# Patient Record
Sex: MALE | Race: BLACK OR AFRICAN AMERICAN | Employment: FULL TIME | URBAN - METROPOLITAN AREA
[De-identification: names, ages, dates, MRNs, and addresses within clinical notes are randomized per-mention and may not be internally consistent; named-entity substitution may affect disease eponyms.]

---

## 2018-08-03 PROBLEM — R29.810 FACIAL DROOP: Status: ACTIVE | Noted: 2018-08-03

## 2018-08-03 PROBLEM — I63.9 CEREBROVASCULAR ACCIDENT (CVA) DETERMINED BY CLINICAL ASSESSMENT (HCC): Status: ACTIVE | Noted: 2018-08-03

## 2018-08-03 PROBLEM — I63.9 ACUTE CVA (CEREBROVASCULAR ACCIDENT) (HCC): Status: ACTIVE | Noted: 2018-08-03

## 2019-10-29 ENCOUNTER — OFFICE VISIT (OUTPATIENT)
Dept: ORTHOPEDIC SURGERY | Age: 49
End: 2019-10-29

## 2019-10-29 ENCOUNTER — HOSPITAL ENCOUNTER (OUTPATIENT)
Dept: OCCUPATIONAL MEDICINE | Age: 49
Discharge: HOME | End: 2019-10-29
Attending: FAMILY MEDICINE

## 2019-10-29 VITALS
TEMPERATURE: 99.1 F | HEART RATE: 80 BPM | HEIGHT: 76 IN | WEIGHT: 262.2 LBS | DIASTOLIC BLOOD PRESSURE: 88 MMHG | BODY MASS INDEX: 31.93 KG/M2 | SYSTOLIC BLOOD PRESSURE: 128 MMHG | RESPIRATION RATE: 15 BRPM

## 2019-10-29 DIAGNOSIS — M54.17 LUMBOSACRAL RADICULOPATHY AT L4: Primary | ICD-10-CM
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
## 2019-10-29 DIAGNOSIS — M54.17 LUMBOSACRAL RADICULOPATHY AT L4: ICD-10-CM

## 2019-10-29 RX ORDER — PREDNISONE 20 MG/1
TABLET ORAL
Qty: 28 TAB | Refills: 0 | Status: SHIPPED | OUTPATIENT
Start: 2019-10-29 | End: 2019-11-10 | Stop reason: SDUPTHER

## 2019-10-29 RX ORDER — NAPROXEN 250 MG/1
TABLET ORAL 2 TIMES DAILY WITH MEALS
COMMUNITY

## 2019-10-29 NOTE — LETTER
10/29/2019 2:33 PM 
 
Mr. Mark Carranza 
[address] [facility] [address] Will be off work due to medical condition until after follow-up following MRI, likely 2 weeks.  
 
 
Sincerely, 
 
 
Bryn Murillo DO

## 2019-10-29 NOTE — PROGRESS NOTES
HISTORY OF PRESENT ILLNESS    Mark is a 47y.o. year old male comes in today as new patient for: low back pain to right foot    Patients symptoms have been present for 3 weeks. Pain level 8/10 right low back and numb/tingle to right leg. It is unchanged with chiro and has done some PT first appt last week after referred at [facility].  Patient has tried:  Naproxen and robaxin without benefit. It is described as pain in low back into right foot most noticeable right great toe. Had helped someone lift something heavy at [employer] working a few days later had Sx into leg. + popping. IMAGING: XR lumbar pending. History reviewed. No pertinent surgical history. Social History     Socioeconomic History    Marital status: UNKNOWN     Spouse name: Not on file    Number of children: Not on file    Years of education: Not on file    Highest education level: Not on file   Tobacco Use    Smoking status: Never Smoker    Smokeless tobacco: Never Used   Substance and Sexual Activity    Alcohol use: Never     Alcohol/week: 0.0 standard drinks     Frequency: Never    Drug use: Never      Current Outpatient Medications   Medication Sig Dispense Refill    METHOCARBAMOL PO Take  by mouth.  naproxen (NAPROSYN) 250 mg tablet Take  by mouth two (2) times daily (with meals).  aspirin 81 mg chewable tablet Take 1 Tab by mouth daily. 30 Tab 0    amLODIPine-benazepril (LOTREL) 5-20 mg per capsule Take 1 Cap by mouth daily.  rosuvastatin (CRESTOR) 20 mg tablet Take 20 mg by mouth nightly. Past Medical History:   Diagnosis Date    Hypertension     Stroke [facility])      History reviewed. No pertinent family history. ROS:  No incont, fever. All other systems reviewed and negative aside from that written in the HPI. Objective:  /88   Pulse 80   Temp 99.1 °F (37.3 °C)   Resp 15   Ht 6' 4\" (1.93 m)   Wt 262 lb 3.2 oz (118.9 kg)   BMI 31.92 kg/m²   GEN:  Appears stated age in NAD.   NEURO: Sensation intact to light touch. Reflexes +1/4 patellar and Achilles bilaterally. M/S:  Examined seated and supine. Slump negative. LE Strength +5/5 bilaterally Piriformis some TTP right  EXT:  no clubbing/cyanosis. no edema. SKIN: Warm & dry w/o rash. HEENT: Conjunctiva/lids WNL. External canals/nares WNL. Tongue midline. PERRL, EOMI. Hearing intact. NECK: Trachea midline. Supple, Full ROM. No thyromegaly. CARDIAC: No edema. LUNGS: Normal effort. ABD: Soft, no masses. No HSM. PSYCH: A+O x3. Appropriate judgment and insight. Assessment/Plan:     ICD-10-CM ICD-9-CM    1. Lumbosacral radiculopathy at L4 M54.17 724.4 XR SPINE LUMB 2 OR 3 V      MRI LUMB SPINE WO CONT      predniSONE (DELTASONE) 20 mg tablet   2. Work related injury Y99.0 959.9 XR SPINE LUMB 2 OR 3 V      MRI LUMB SPINE WO CONT         Patient (or guardian if minor) verbalizes understanding of evaluation and plan. Will continue PT and Rx prednisone and await MRI and return for result. Letter off work until f/u.

## 2019-10-29 NOTE — PROGRESS NOTES
AVS reviewed: YES  HEP: N/A  Resources Provided: YES Printed Rx, work letter & MRI instructions  Patient questions/concerns answered: YES.  Confirmed MRI would be completed at [facility] or [address] [place] [address]  Patient verbalized understanding of treatment plan: Malik Otero

## 2019-10-29 NOTE — PATIENT INSTRUCTIONS
Continue with physical therapy, use medication as prescribed and return to clinic after MRI for results. You should receive call to schedule the MRI. If have not been called to schedule within a couple of days, please call [phone] to schedule. After MRI is scheduled, please call our office and schedule follow-up appointment for 2-3 days after the date of the MRI.

## 2019-10-29 NOTE — PROGRESS NOTES
Pt reports toe numbness in R foot. Ice and some massage pads helped short-term. Has been to chiro w/ some benefit.

## 2019-11-08 ENCOUNTER — HOSPITAL ENCOUNTER (OUTPATIENT)
Dept: MRI IMAGING | Age: 49
Discharge: HOME | End: 2019-11-08
Attending: FAMILY MEDICINE
Payer: SELF-PAY
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
## 2019-11-08 DIAGNOSIS — M54.17 LUMBOSACRAL RADICULOPATHY AT L4: ICD-10-CM

## 2019-11-08 PROCEDURE — 72148 MRI LUMBAR SPINE W/O DYE: CPT

## 2019-11-10 DIAGNOSIS — M54.17 LUMBOSACRAL RADICULOPATHY AT L4: ICD-10-CM

## 2019-11-12 RX ORDER — PREDNISONE 20 MG/1
TABLET ORAL
Qty: 28 TAB | Refills: 0 | Status: SHIPPED | OUTPATIENT
Start: 2019-11-12 | End: 2019-11-12 | Stop reason: SDUPTHER

## 2019-11-12 RX ORDER — PREDNISONE 20 MG/1
TABLET ORAL
Qty: 28 TAB | Refills: 0 | Status: SHIPPED | OUTPATIENT
Start: 2019-11-12 | End: 2019-11-15 | Stop reason: ALTCHOICE

## 2019-11-15 ENCOUNTER — OFFICE VISIT (OUTPATIENT)
Dept: ORTHOPEDIC SURGERY | Age: 49
End: 2019-11-15

## 2019-11-15 VITALS
WEIGHT: 258 LBS | RESPIRATION RATE: 15 BRPM | HEIGHT: 76 IN | TEMPERATURE: 98.9 F | HEART RATE: 105 BPM | DIASTOLIC BLOOD PRESSURE: 88 MMHG | BODY MASS INDEX: 31.42 KG/M2 | SYSTOLIC BLOOD PRESSURE: 136 MMHG

## 2019-11-15 DIAGNOSIS — M54.17 LUMBOSACRAL RADICULOPATHY AT S1: Primary | ICD-10-CM

## 2019-11-15 DIAGNOSIS — M51.36 BULGING LUMBAR DISC: ICD-10-CM

## 2019-11-15 NOTE — PROGRESS NOTES
HISTORY OF PRESENT ILLNESS    Mark is a 47y.o. year old male comes in today to be evaluated and treated for: back pain/MRI results    Since last appt has noticed improvement with prednisone taper. Pain level 5/10. Still significant pain low back into right foot and numbness great toe. PT helps while there but Sx recur after leaving. IMAGING: MRI lumbar 11/8/19  IMPRESSION:  No acute bony injury. Multilevel chronic degenerative disc disease L4/L5 and L5/S1. Large disc protrusion L5/S1 level impinges upon exiting right S1 nerve root and  narrows right neural foramen. Multiple levels facet arthropathy L2/L3 through L5/S1    History reviewed. No pertinent surgical history. Social History     Socioeconomic History    Marital status: UNKNOWN     Spouse name: Not on file    Number of children: Not on file    Years of education: Not on file    Highest education level: Not on file   Tobacco Use    Smoking status: Never Smoker    Smokeless tobacco: Never Used   Substance and Sexual Activity    Alcohol use: Never     Alcohol/week: 0.0 standard drinks     Frequency: Never    Drug use: Never     Current Outpatient Medications   Medication Sig Dispense Refill    METHOCARBAMOL PO Take  by mouth.  naproxen (NAPROSYN) 250 mg tablet Take  by mouth two (2) times daily (with meals).  aspirin 81 mg chewable tablet Take 1 Tab by mouth daily. 30 Tab 0    amLODIPine-benazepril (LOTREL) 5-20 mg per capsule Take 1 Cap by mouth daily.  rosuvastatin (CRESTOR) 20 mg tablet Take 20 mg by mouth nightly. Past Medical History:   Diagnosis Date    Hypertension     Stroke [facility])      History reviewed. No pertinent family history. ROS:  No incont, fever. Objective:  /88   Pulse (!) 105   Temp 98.9 °F (37.2 °C)   Resp 15   Ht 6' 4\" (1.93 m)   Wt 258 lb (117 kg)   BMI 31.40 kg/m²   GEN:  Appears stated age in NAD. NEURO:  Sensation decreased right lower leg.   Reflexes +1/4 patellar and Achilles bilaterally. M/S:  Examined seated and supine. Slump negative. LE Strength +5/5 bilaterally but decreased toe/foot dorsiflexion right. Piriformis some TTP right  EXT:  no clubbing/cyanosis. no edema. SKIN: Warm & dry w/o rash. Assessment/Plan:     ICD-10-CM ICD-9-CM    1. Lumbosacral radiculopathy at S1 M54.17 724.4 REFERRAL TO ORTHOPEDICS   2. Bulging lumbar disc M51.26 722.10 REFERRAL TO ORTHOPEDICS       Patient (or guardian if minor) verbalizes understanding of evaluation and plan.     Will continue prednisone and refer PMR for eval disc protrusion causing S1 radicular Sx

## 2019-11-15 NOTE — PROGRESS NOTES
AVS reviewed: YES  HEP: N/A  Resources Provided: YES work letter & referral to spine center  Patient questions/concerns answered: NO. Pt denied questions/concerns  Patient verbalized understanding of treatment plan: YES

## 2019-11-15 NOTE — LETTER
NOTIFICATION RETURN TO WORK / SCHOOL 
 
11/15/2019 11:40 AM 
 
Mr. Mark Baires [address] [facility] [id number] To Whom It May Concern: 
 
Mark Medrano is currently under the care of Alie Reid 2.. Off work pending evaluation spine specialist. 
 
If there are questions or concerns please have the patient contact our office.  
 
 
 
Sincerely, 
 
 
Venkata Zuniga, DO

## 2019-11-15 NOTE — PATIENT INSTRUCTIONS
Continue to use medication as prescribed, follow-up on referral to the [address] and return to our office if needed.

## 2019-11-18 ENCOUNTER — OFFICE VISIT (OUTPATIENT)
Dept: ORTHOPEDIC SURGERY | Age: 49
End: 2019-11-18

## 2019-11-18 VITALS
HEART RATE: 108 BPM | HEIGHT: 76 IN | DIASTOLIC BLOOD PRESSURE: 99 MMHG | OXYGEN SATURATION: 98 % | WEIGHT: 265 LBS | TEMPERATURE: 98.3 F | BODY MASS INDEX: 32.27 KG/M2 | SYSTOLIC BLOOD PRESSURE: 145 MMHG

## 2019-11-18 DIAGNOSIS — M51.26 HNP (HERNIATED NUCLEUS PULPOSUS), LUMBAR: ICD-10-CM

## 2019-11-18 DIAGNOSIS — M54.16 RIGHT LUMBAR RADICULOPATHY: Primary | ICD-10-CM

## 2019-11-18 RX ORDER — GABAPENTIN 300 MG/1
300 CAPSULE ORAL 3 TIMES DAILY
Qty: 90 CAP | Refills: 1 | Status: SHIPPED | OUTPATIENT
Start: 2019-11-18 | End: 2020-02-23 | Stop reason: SDUPTHER

## 2019-11-18 RX ORDER — ACETAMINOPHEN AND CODEINE PHOSPHATE 300; 30 MG/1; MG/1
1 TABLET ORAL
Qty: 21 TAB | Refills: 0 | Status: SHIPPED | OUTPATIENT
Start: 2019-11-18 | End: 2019-11-25

## 2019-11-18 RX ORDER — LIDOCAINE HYDROCHLORIDE 20 MG/ML
0.5 INJECTION, SOLUTION EPIDURAL; INFILTRATION; INTRACAUDAL; PERINEURAL ONCE
Qty: 0.5 ML | Refills: 0
Start: 2019-11-18 | End: 2019-11-18

## 2019-11-18 RX ORDER — BETAMETHASONE SODIUM PHOSPHATE AND BETAMETHASONE ACETATE 3; 3 MG/ML; MG/ML
12 INJECTION, SUSPENSION INTRA-ARTICULAR; INTRALESIONAL; INTRAMUSCULAR; SOFT TISSUE ONCE
Qty: 2 ML | Refills: 0
Start: 2019-11-18 | End: 2019-11-18

## 2019-11-18 RX ORDER — BUPIVACAINE HYDROCHLORIDE 2.5 MG/ML
0.5 INJECTION, SOLUTION INFILTRATION; PERINEURAL ONCE
Qty: 0.5 ML | Refills: 0
Start: 2019-11-18 | End: 2019-11-18

## 2019-11-18 NOTE — PROGRESS NOTES
Verbal order entered per Dr. Marisa Tyler as documented on blue sheet:Right iliolumbar-Trigger point injection, 2 ml celestone, 0.5 ml lidocaine, 0.5 ml marcaine

## 2019-11-18 NOTE — PROGRESS NOTES
[address], [address]  [address]  Phone: [phone]  Fax: [phone]        Beltran Manuel  : 1970  PCP: Hernan Lopez MD      NEW PATIENT      ASSESSMENT AND PLAN     Diagnoses and all orders for this visit:    1. Right lumbar radiculopathy w/foot drop  -     bupivacaine (MARCAINE) 0.25 % (2.5 mg/mL) soln injection; 0.5 mL by SubCUTAneous route once for 1 dose. -     INJECTION, BUPIVICAINE HYDRO  -     LIDOCAINE INJECTION  -     lidocaine, PF, (XYLOCAINE) 20 mg/mL (2 %) injection; 0.5 mL by Other route once for 1 dose. -     betamethasone (CELESTONE SOLUSPAN) 6 mg/mL injection; 2 mL by IntraMUSCular route once for 1 dose.  -     BETAMETHASONE ACETATE & SODIUM PHOSPHATE INJECTION 3 MG EA.  -     AZ INJECT TRIGGER POINT, 1 OR 2  -     gabapentin (NEURONTIN) 300 mg capsule; Take 1 Cap by mouth three (3) times daily. Max Daily Amount: 900 mg.  -     acetaminophen-codeine (TYLENOL-CODEINE #3) 300-30 mg per tablet; Take 1 Tab by mouth three (3) times daily as needed for Pain (severe) for up to 7 days. Max Daily Amount: 3 Tabs. 1 tab po every day to TID prn    2. HNP (herniated nucleus pulposus), lumbar, RL5/S1       1. Advised to stay active as tolerated. 2. Trial of Gabapentin  3. Discussed life style modification, PT, medication, TPI, spinal injection, and surgery as treatment options   4. Acute pain rx of T#3  5. R iliolumbar TPI  6. Given information on herniated disc, deciding on surgery, TPI    F/U with Dr. Pamela Funes for surgical eval      CHIEF Nathaniel Bowles is seen today in consultation at the request of Dr. Truong Izaguirre for complaints of low back pain radiating into RLE. HISTORY OF PRESENT ILLNESS  Mark Toribio is a 47 y.o. male. Today pt c/o low back and RLE pain since 18. Pt has had trauma that caused pain. He reports an injury twisting and lifting cables at work. Works as  at [employer].  Has been OOW.    Pt reports a hx of pinched nerve 2009. He reports little benefit with PT this time. He states since then his pain has not improved. He reports he has been unable to return to work since 9/20/19. Denies trying Gabapentin previously. PT/NSAID/muscle relaxer/chiro not really helping. MRI images reviewed with patient, prominent protrusion canal, lateral recess, and far lateral. No B/B issues. Location of pain: low back  Does pain radiate into extremities: RLE to toe, numbness in toe. L5 distribution  Does patient have weakness: R foot flops  Pt denies saddle paresthesias. Medications pt is on: Naprosyn 250mg PRN some benefit. Robaxin little benefit. MDP little benefit. Pt denies recent ED visits or hospitalizations. Denies persistent fevers, chills, weight changes, neurogenic bowel or bladder symptoms. Pt denies any recent fevers, chills, antibiotics, recent cortisone injections, or infections. Treatments patient has tried:  Physical therapy:Yes current low back temporary benefit  Doing HEP: Yes  Non-opioid medications: Yes  Spinal injections: No  Spinal surgery- No.   Last L MRI 2019: R L5-S1 disc protrusion     reviewed. PMHx of HTN, CVA, no residuals. Pt works as an  for [employer]. OOW since injury, on STD. Pain Assessment  11/18/2019   Location of Pain Back;Leg   Location Modifiers -   Severity of Pain 6   Quality of Pain Other (Comment)   Quality of Pain Comment stabbing   Duration of Pain Persistent   Frequency of Pain Constant   Aggravating Factors Straightening; Other (Comment)   Aggravating Factors Comment laying on effected side   Limiting Behavior -   Relieving Factors Ice;NSAID   Result of Injury -         MRI Results (most recent):  Results from Hospital Encounter encounter on 11/08/19   MRI LUMB SPINE WO CONT    Narrative MR LUMBAR SPINE WITHOUT CONTRAST    CPT CODE: 22862    HISTORY: Right-sided back pain for 2 months with no known injury.  Pain  persisting despite conservative treatment. Radicular symptoms after lifting a  heavy object 3 weeks ago. Difficulty ambulating. COMPARISON: Plain film series 10/29/2019    TECHNIQUE:  Axial and sagittal sequences of lumbar spine using T1, IR, T2  information. Motion artifact such that multiple series had to be repeated    FINDINGS:     Alignment: Normal alignment is seen of the lumbar spine. Overall straightening  normal lordosis. Vertebral body height: There is no acute loss of vertebral body height. Bone marrow signal: Fatty infiltration around the endplates C4/Z8. Conus: is unremarkable and ends at the L1 level. Disc spaces: Decreased T2 signal L4/L5 and L5/S1 disc spaces. There is moderate  disc space narrowing L4/L5. Soft tissues: High T2 signal lesion right kidney measures 11 mm and is  compatible with cyst.      At the L1/L2 level,  no central or foraminal stenosis. At the L2/L3 level,  no central or foraminal stenosis. Mild ligamentous and  facet hypertrophy. At the L3/L4 level,  no central or foraminal stenosis. Mild ligamentous and  facet hypertrophy. At the L4/L5 level,  disc desiccation. Mild to moderate facet and ligamentous  hypertrophy. Mild narrowing of the foramina due to facet encroachment. At the L5/S1 level,  there is right posterolateral and far lateral disc  protrusion with underlying mild diffuse bulge of the disc. No significant mass  effect on thecal sac. Protruding disc material on the right markedly narrows right lateral recess  displacing exiting S1 nerve root posteriorly, narrows right neural foramen. Left  foramen mildly narrowed due to spondylitic changes and facet encroachment. .  Moderate facet and ligamentous hypertrophy. Impression IMPRESSION:    No acute bony injury. Multilevel chronic degenerative disc disease L4/L5 and L5/S1. Large disc protrusion L5/S1 level impinges upon exiting right S1 nerve root and  narrows right neural foramen.     Multiple levels facet arthropathy L2/L3 through L5/S1          PAST MEDICAL HISTORY   Past Medical History:   Diagnosis Date    Hypertension     Stroke [facility])        History reviewed. No pertinent surgical history. MEDICATIONS      Current Outpatient Medications   Medication Sig Dispense Refill    bupivacaine (MARCAINE) 0.25 % (2.5 mg/mL) soln injection 0.5 mL by SubCUTAneous route once for 1 dose. 0.5 mL 0    lidocaine, PF, (XYLOCAINE) 20 mg/mL (2 %) injection 0.5 mL by Other route once for 1 dose. 0.5 mL 0    betamethasone (CELESTONE SOLUSPAN) 6 mg/mL injection 2 mL by IntraMUSCular route once for 1 dose. 2 mL 0    gabapentin (NEURONTIN) 300 mg capsule Take 1 Cap by mouth three (3) times daily. Max Daily Amount: 900 mg. 90 Cap 1    acetaminophen-codeine (TYLENOL-CODEINE #3) 300-30 mg per tablet Take 1 Tab by mouth three (3) times daily as needed for Pain (severe) for up to 7 days. Max Daily Amount: 3 Tabs. 1 tab po every day to TID prn 21 Tab 0    METHOCARBAMOL PO Take  by mouth.  naproxen (NAPROSYN) 250 mg tablet Take  by mouth two (2) times daily (with meals).  amLODIPine-benazepril (LOTREL) 5-20 mg per capsule Take 1 Cap by mouth daily.  rosuvastatin (CRESTOR) 20 mg tablet Take 20 mg by mouth nightly.  aspirin 81 mg chewable tablet Take 1 Tab by mouth daily.  30 Tab 0       ALLERGIES  No Known Allergies       SOCIAL HISTORY    Social History     Socioeconomic History    Marital status: UNKNOWN     Spouse name: Not on file    Number of children: Not on file    Years of education: Not on file    Highest education level: Not on file   Occupational History    Not on file   Social Needs    Financial resource strain: Not on file    Food insecurity:     Worry: Not on file     Inability: Not on file    Transportation needs:     Medical: Not on file     Non-medical: Not on file   Tobacco Use    Smoking status: Never Smoker    Smokeless tobacco: Never Used   Substance and Sexual Activity    Alcohol use: Never     Alcohol/week: 0.0 standard drinks     Frequency: Never    Drug use: Never    Sexual activity: Not on file   Lifestyle    Physical activity:     Days per week: Not on file     Minutes per session: Not on file    Stress: Not on file   Relationships    Social connections:     Talks on phone: Not on file     Gets together: Not on file     Attends Evangelical service: Not on file     Active member of club or organization: Not on file     Attends meetings of clubs or organizations: Not on file     Relationship status: Not on file    Intimate partner violence:     Fear of current or ex partner: Not on file     Emotionally abused: Not on file     Physically abused: Not on file     Forced sexual activity: Not on file   Other Topics Concern    Not on file   Social History Narrative    Not on file       FAMILY HISTORY  No family history on file. REVIEW OF SYSTEMS  Review of Systems   Constitutional: Negative for chills, fever and weight loss. Respiratory: Negative for shortness of breath. Cardiovascular: Negative for chest pain. Gastrointestinal: Negative for constipation. Negative for fecal incontinence   Genitourinary: Negative for dysuria. Negative for urinary incontinence   Musculoskeletal:        Per HPI   Skin: Negative for rash. Neurological: Positive for tingling and focal weakness. Negative for dizziness, tremors and headaches. Endo/Heme/Allergies: Does not bruise/bleed easily. Psychiatric/Behavioral: The patient does not have insomnia. PHYSICAL EXAMINATION  Visit Vitals  BP (!) 145/99 (BP 1 Location: Left arm, BP Patient Position: Sitting)   Pulse (!) 108   Temp 98.3 °F (36.8 °C) (Oral)   Ht 6' 4\" (1.93 m)   Wt 265 lb (120.2 kg)   SpO2 98%   BMI 32.26 kg/m²          Accompanied by self. Constitutional:  Well developed, well nourished, in no acute distress. Psychiatric: Affect and mood are appropriate.    Integumentary: No rashes or abrasions noted on exposed areas. Cardiovascular/Peripheral Vascular: No peripheral edema is noted BLE. SPINE/MUSCULOSKELETAL EXAM       Lumbar spine:  No rash, ecchymosis, or gross obliquity. No fasciculations. No focal atrophy is noted. Tenderness to palpation R L4-5, R Sciatic notch. SI joints non-tender. Trochanters non tender. MOTOR:       Hip Flex  Quads Hamstrings Ankle DF EHL Ankle PF   Right +4/5 +4/5 +4/5 4/5 +4/5 +4/5   Left +4/5 +4/5 +4/5 +4/5 +4/5 +4/5     Straight Leg raise positive R 45 degrees. Offloads R buttock. No difficulty with tandem gait. Radiating RLE pain with Heel walk. Radiating RLE pain with Toe rise. Ambulation with quad cane with limp. DWB RLE. [facility]  OFFICE PROCEDURE PROGRESS NOTE      PROCEDURE: In the office today after informed consent using aseptic technique, the patient was injected with a total of 2 cc of Celestone, 0.5 cc each of Lidocaine and Marcaine into his right iliolumbar trigger point. Chart reviewed for the following:   Dr. Bonnie VIRGEN, have reviewed the History, Physical and updated the Allergic reactions for Ilex Tiff Courts performed immediately prior to start of procedure:   Dr. Bonnie VIRGEN, have performed the following reviews on Ilex Grace Swan prior to the start of the procedure:            * Patient was identified by name and date of birth   * Agreement on procedure being performed was verified  * Risks and Benefits explained to the patient  * Procedure site verified and marked as necessary  * Patient was positioned for comfort  * Consent was signed and verified     Time: 2:07 PM     Date of procedure: 11/18/2019    Procedure performed by:  Harrison Palm MD    Provider assisted by: None    Patient assisted by: Self    How tolerated by patient: Pt tolerated the procedure well with no complications.               Written by Kishor Grimm, as dictated by Bonnie Perry MD.    Dr. PROMISE Elif Don MD, confirm that all documentation is accurate. Mr. Anne Marie Don may have a reminder for a \"due or due soon\" health maintenance. I have asked that he contact his primary care provider for follow-up on this health maintenance.

## 2019-12-06 ENCOUNTER — OFFICE VISIT (OUTPATIENT)
Dept: ORTHOPEDIC SURGERY | Age: 49
End: 2019-12-06

## 2019-12-06 VITALS
DIASTOLIC BLOOD PRESSURE: 96 MMHG | SYSTOLIC BLOOD PRESSURE: 158 MMHG | BODY MASS INDEX: 31.9 KG/M2 | HEART RATE: 93 BPM | RESPIRATION RATE: 18 BRPM | HEIGHT: 76 IN | WEIGHT: 262 LBS

## 2019-12-06 DIAGNOSIS — M51.26 HNP (HERNIATED NUCLEUS PULPOSUS), LUMBAR: Primary | ICD-10-CM

## 2019-12-06 NOTE — PROGRESS NOTES
[address]  [address], [address]  [place], [address] [place] [address] Street  Phone: [phone]  Fax: [phone]  INITIAL CONSULTATION  Patient: Bia Zuniga                MRN: [id number]       SSN: [id number]  YOB: 1970        AGE: 47 y.o. SEX: male  Body mass index is 31.89 kg/m². PCP: Germain Harris MD  12/06/19    Chief Complaint   Patient presents with    Back Pain     SC         HISTORY OF PRESENT ILLNESS, RADIOGRAPHS, and PLAN:         HISTORY OF PRESENT ILLNESS:  Mr. Jessica Kan is seen today at the request of Dr. Cirilo Castro and Dr. Cristela Jackson. Mr. Jessica Kan is a pleasant, 50-year-old male with a history of hypertension. He works doing manual labor. He was working installing a pump in September. The pump was tipping over, and he grabbed it, and the lifting and twisting motion gave onset of severe, right-sided back and lower extremity pain. He has been through medications, physical therapy, and injections. He developed weakness in his right foot with a relative right-sided foot drop with 3-4/5 weakness of his EHL and tib/ant. He has an equivocal straight leg raise on that right side. He is referred by Dr. Cristela Jackson for consideration of surgery. RADIOGRAPHS:  MRI of his lumbar spine demonstrates a very large disc herniation extending from the lateral region at L5-S1 on the right extending up all the way across the facet to the far lateral region of L5-S1, as if the entire quadrant of the disc gave way at L5-S1 on the patients right-hand side obliterating the foramina, lateral recess, and also causing probable L5 root compression extra foraminally in a far lateral region. The patient has been on Gabapentin. His pain recently over the past week or two is moderated from a 10/10 to a 5/10. He feels more comfortable and is more able to ambulate. ASSESSMENT/PLAN: I discussed the matter at length with him.   We have a patient with relative weakness in his right leg and a very large disc herniation who is about, now, three months out from his injury and is noting some improvement in his pain, though on neuropathics. His disc injury, however, is severe, and it is the type of disc injury that adequately decompressed would likely require a decompression and fusion of L5-S1 given that I would need to do a facetectomy at that segment to decompress the foramina and get this very large disc herniation removed, that facetectomy would require stabilization in a L5-S1 fusion. I discussed the matter at length with him. I think given his recent improvement and the significant scale of the surgery that would be entailed, continued observation and exercise is reasonable. If he continues to improve while on Gabapentin, and his pain moderates, we may be able to get away with this problem without consideration of surgery. However, should his symptoms plateau or worsen, and L5-S1 laminectomy and fusion, I believe, would be in order. I will see him back again in four weeks to monitor his progress. I provided him an out of work note for his pending Workers Compensation complaint. cc: KAY Wright M.D. Past Medical History:   Diagnosis Date    Hypertension     Stroke [facility])        History reviewed. No pertinent family history. Current Outpatient Medications   Medication Sig Dispense Refill    gabapentin (NEURONTIN) 300 mg capsule Take 1 Cap by mouth three (3) times daily. Max Daily Amount: 900 mg. 90 Cap 1    naproxen (NAPROSYN) 250 mg tablet Take  by mouth two (2) times daily (with meals).  aspirin 81 mg chewable tablet Take 1 Tab by mouth daily. 30 Tab 0    amLODIPine-benazepril (LOTREL) 5-20 mg per capsule Take 1 Cap by mouth daily.  rosuvastatin (CRESTOR) 20 mg tablet Take 20 mg by mouth nightly.  METHOCARBAMOL PO Take  by mouth. No Known Allergies    History reviewed.  No pertinent surgical history. Past Medical History:   Diagnosis Date    Hypertension     Stroke [facility])        Social History     Socioeconomic History    Marital status: UNKNOWN     Spouse name: Not on file    Number of children: Not on file    Years of education: Not on file    Highest education level: Not on file   Occupational History    Not on file   Social Needs    Financial resource strain: Not on file    Food insecurity:     Worry: Not on file     Inability: Not on file    Transportation needs:     Medical: Not on file     Non-medical: Not on file   Tobacco Use    Smoking status: Never Smoker    Smokeless tobacco: Never Used   Substance and Sexual Activity    Alcohol use: Never     Alcohol/week: 0.0 standard drinks     Frequency: Never    Drug use: Never    Sexual activity: Not on file   Lifestyle    Physical activity:     Days per week: Not on file     Minutes per session: Not on file    Stress: Not on file   Relationships    Social connections:     Talks on phone: Not on file     Gets together: Not on file     Attends Christian service: Not on file     Active member of club or organization: Not on file     Attends meetings of clubs or organizations: Not on file     Relationship status: Not on file    Intimate partner violence:     Fear of current or ex partner: Not on file     Emotionally abused: Not on file     Physically abused: Not on file     Forced sexual activity: Not on file   Other Topics Concern    Not on file   Social History Narrative    Not on file           REVIEW OF SYSTEMS:   CONSTITUTIONAL SYMPTOMS:  Negative. EYES:  Negative. EARS, NOSE, THROAT AND MOUTH:  Negative. CARDIOVASCULAR:  Negative. RESPIRATORY:  Negative. GENITOURINARY: Per HPI. GASTROINTESTINAL:  Per HPI. INTEGUMENTARY (SKIN AND/OR BREAST):  Negative. MUSCULOSKELETAL: Per HPI.   ENDOCRINE/RHEUMATOLOGIC:  Negative. NEUROLOGICAL:  Per HPI. HEMATOLOGIC/LYMPHATIC:  Negative.    ALLERGIC/IMMUNOLOGIC: Negative. PSYCHIATRIC:  Negative. PHYSICAL EXAMINATION:   Visit Vitals  BP (!) 158/96 (BP 1 Location: Left arm, BP Patient Position: Sitting)   Pulse 93   Resp 18   Ht 6' 4\" (1.93 m)   Wt 262 lb (118.8 kg)   BMI 31.89 kg/m²    PAIN SCALE: 5/10    CONSTITUTIONAL: The patient is in no apparent distress and is alert and oriented x 3. HEENT: Normocephalic. Hearing grossly intact. NECK: Supple and symmetric. no tenderness, or masses were felt. RESPIRATORY: No labored breathing. CARDIOVASCULAR: The carotid pulses were normal. Peripheral pulses were 2+. CHEST: Normal AP diameter and normal contour without any kyphoscoliosis. LYMPHATIC: No lymphadenopathy was appreciated in the neck, axillae or groin. SKIN:  Negative for scars, rashes, lesions, or ulcers on the right upper, right lower, left upper, left lower and trunk. NEUROLOGICAL: Alert and oriented x 3. Ambulation without assistive device. FWB. EXTREMITIES:  See musculoskeletal.  MUSCULOSKELETAL:   Head and Neck:  Negative for misalignment, asymmetry, crepitation, defects, tenderness masses or effusions.  Left Upper Extremity: Inspection, percussion and palpation performed. Hoangs sign is negative.  Right Upper Extremity: Inspection, percussion and palpation performed. Hoangs sign is negative.  Spine, Ribs and Pelvis: Back pain radiating into RLE. Inspection, percussion and palpation performed. Negative for misalignment, asymmetry, crepitation, defects, tenderness masses or effusions.  Left Lower Extremity: Inspection, percussion and palpation performed. Negative straight leg raise.  Right Lower Extremity: Radiating pain L5. Numbness. Weakness in EHL. Mild weakness of Tib/ant. Inspection, percussion and palpation performed. Equivocal straight leg raise. SPINE EXAM:     Lumbar spine: No rash, ecchymosis, or gross obliquity. No focal atrophy is noted. ASSESSMENT    ICD-10-CM ICD-9-CM    1.  HNP (herniated nucleus pulposus), lumbar, RL5/S1 M51.26 722.10        Written by Sam Romero, as dictated by Mil Mace MD.    I, Dr. Mil Mace MD, confirm that all documentation is accurate.

## 2019-12-06 NOTE — PATIENT INSTRUCTIONS
Deciding About Surgery for a Herniated Disc How can you decide about surgery for a herniated disc? What kinds of surgery are done for a herniated disc? The most common surgeries are: · Discectomy. It takes out part of the disc that is pressing on a nerve root or on the spinal cord. It works best for people who still have bad pain (sciatica) after they have tried other treatments. · Percutaneous discectomy. It also takes out any part of the disc that is pressing on a nerve. But it is done with a special tool through a very small cut. · Laminotomy and laminectomy. They ease pressure caused by changes in the spine from aging. ? Laminotomy takes out some of the lamina. This is the thin part of the vertebrae that protects the spinal cord. ? Laminectomy takes out most of or all of the lamina. It also may take out tissue that is making the spinal canal too narrow. What are key points about this decision? · Most people get better after a few months of treatment without surgery. This treatment includes rest, medicines, shots, and rehabilitation. · Surgery may be a good choice if you have severe pain, numbness, or weakness in your buttock and leg. This is called sciatica. It can keep you from being able to do your daily activities. · If you have sciatica from a herniated disc, you will probably feel better sooner if you have surgery. But after 5 to 10 years, how well you can do your daily activities will probably be about the same whether you have surgery or not. · A doctor may advise you to have surgery. Ask your doctor about the possible risks and benefits of surgery and other treatments. The more you know, the better your decision will be. · Surgery for a herniated disc does not often cause problems. But there is a slight risk of harming nerves or the spine during surgery. Or you could have problems from the anesthesia. There is also a chance that you could get an infection. Why might you choose surgery? · Surgery works well for many people who have sciatica from a herniated disc. · Surgery offers faster pain relief than other treatment. · With surgery, most people can go back to work sooner. · You have tried exercises and medicines for a few months. You do not think they have helped you. · You feel okay about the idea of having back surgery. Why might you choose not to have surgery? · Treatments without surgery work for most people. · Research shows that 10 years after treatment, people who did not have surgery are just about as likely to be able to do their daily activities as people who had surgery. · You do not like the thought of surgery. · Your pain is not bad enough that you need to have surgery right now. Your decision Thinking about the facts and your feelings can help you make a decision that is right for you. Be sure you understand the benefits and risks of your options, and think about what else you need to do before you make the decision. Where can you learn more? Go to http://elizabeth-adelaida.info/. Enter U714 in the search box to learn more about \"Deciding About Surgery for a Herniated Disc. \" Current as of: June 26, 2019 Content Version: 12.2 © 3762-2606 [employer]. Care instructions adapted under license by [employer] (which disclaims liability or warranty for this information). If you have questions about a medical condition or this instruction, always ask your healthcare professional. [place] [address] any warranty or liability for your use of this information. Lumbar Spinal Fusion: Before Your Surgery What is lumbar spinal fusion? Spinal fusion is surgery that joins, or fuses, two or more vertebrae together. The joints will no longer be able to move.  
Most of the time, bone from your pelvic bone or from a bone bank is used to make a \"bridge\" between the vertebrae to be joined. Metal rods, wires, or screws are often attached to the vertebrae. This will hold them together until new bone grows between them. Spinal fusion is major surgery. It usually lasts several hours. It involves making a cut in your back or your belly or sometimes both. The cuts, called incisions, leave scars that fade with time. You can expect your back to feel stiff and sore after surgery. You will be given pain medicine. You will probably get up and walk at the hospital. Gaurang Ramos will be in the hospital for several days. It may take 4 to 6 weeks to get back to doing simple activities, such as light housework. Follow-up care is a key part of your treatment and safety. Be sure to make and go to all appointments, and call your doctor if you are having problems. It's also a good idea to know your test results and keep a list of the medicines you take. What happens before surgery? 
 Surgery can be stressful. This information will help you understand what you can expect. And it will help you safely prepare for surgery. 
 Preparing for surgery 
  · Understand exactly what surgery is planned, along with the risks, benefits, and other options. · Tell your doctors ALL the medicines, vitamins, supplements, and herbal remedies you take. Some of these can increase the risk of bleeding or interact with anesthesia.  
  · If you take blood thinners, such as warfarin (Coumadin), clopidogrel (Plavix), or aspirin, be sure to talk to your doctor. He or she will tell you if you should stop taking these medicines before your surgery. Make sure that you understand exactly what your doctor wants you to do.  
  · Your doctor will tell you which medicines to take or stop before your surgery. You may need to stop taking certain medicines a week or more before surgery. So talk to your doctor as soon as you can.  
  · If you have an advance directive, let your doctor know.  It may include a living will and a durable power of  for health care. Bring a copy to the hospital. If you don't have one, you may want to prepare one. It lets your doctor and loved ones know your health care wishes. Doctors advise that everyone prepare these papers before any type of surgery or procedure. What happens on the day of surgery? · Follow the instructions exactly about when to stop eating and drinking. If you don't, your surgery may be canceled. If your doctor told you to take your medicines on the day of surgery, take them with only a sip of water.  
  · Take a bath or shower before you come in for your surgery. Do not apply lotions, perfumes, deodorants, or nail polish.  
  · Do not shave the surgical site yourself.  
  · Take off all jewelry and piercings. And take out contact lenses, if you wear them.  
 At the hospital or surgery center · Bring a picture ID.  
  · The area for surgery is often marked to make sure there are no errors.  
  · You will be kept comfortable and safe by your anesthesia provider. You will be asleep during the surgery.  
  · Surgery will take several hours. Going home · Be sure you have someone to drive you home. Anesthesia and pain medicine make it unsafe for you to drive.  
  · You will be given more specific instructions about recovering from your surgery. They will cover things like diet, wound care, follow-up care, driving, and getting back to your normal routine. When should you call your doctor? · You have questions or concerns.  
  · You do not understand how to prepare for your surgery.  
  · You become ill before surgery (such as fever, cold or flu, chest pain, or shortness of breath).  
  · You need to reschedule or have changed your mind about having the surgery. Where can you learn more? Go to http://elizabeth-adelaida.info/. Enter B752 in the search box to learn more about \"Lumbar Spinal Fusion: Before Your Surgery. \" 
 Current as of: June 26, 2019 Content Version: 12.2 © 9689-1726 [employer]. Care instructions adapted under license by [employer] (which disclaims liability or warranty for this information). If you have questions about a medical condition or this instruction, always ask your healthcare professional. [address] any warranty or liability for your use of this information.

## 2019-12-06 NOTE — LETTER
12/6/2019 1:25 PM 
 
Mr. Ilex Sherlynn Boast [address] [facility] [id number] To Whom It May Concern: 
 
Ilex Sherlynn Boast is currently under the care of [address] [place] [address]. He was evaluated in the office today. He will remain out of work until next office visit in 4 weeks. If there are questions or concerns please have the patient contact our office.  
 
 
 
Sincerely, 
 
 
 
Chelita Quinones MD

## 2019-12-17 ENCOUNTER — DOCUMENTATION ONLY (OUTPATIENT)
Dept: ORTHOPEDIC SURGERY | Age: 49
End: 2019-12-17

## 2019-12-17 NOTE — PROGRESS NOTES
Patients wife dropped off WC & FMLA forms to be completed. She was informed that it will be 7-10 business days. He will  when ready. FORMS SCANNED INTO CHART.  RETURN TO ME FOR PROCESSING!!!!!

## 2019-12-19 ENCOUNTER — TELEPHONE (OUTPATIENT)
Dept: ORTHOPEDIC SURGERY | Age: 49
End: 2019-12-19

## 2019-12-19 NOTE — TELEPHONE ENCOUNTER
FMLA completed and needs Dr Ulysses Quach signature. A narrative for WC needs to be done by Dr. Ulysses Quach and given to Que brownlee to put together the chart for him.

## 2019-12-20 ENCOUNTER — DOCUMENTATION ONLY (OUTPATIENT)
Dept: ORTHOPEDIC SURGERY | Age: 49
End: 2019-12-20

## 2019-12-20 NOTE — PROGRESS NOTES
I called and spoke to patient. I advised patient that FMLA form is ready for . Patient stated understanding. I have scanned completed form into cc. Form has been placed in form box for . No further action needed.

## 2019-12-23 NOTE — PROGRESS NOTES
The other half of the patients request was for a letter to worker's comp. I tried both of the patients numbers in the system and both said they were not available at this time. The work comp letter from Dr. Marshal Klein is complete, however, it is not signed. Dr. Marshal Klein asked me to send it with a note that he will sign when he returns from his leave and send it again.   I am mailing a copy to the patient but not [employer] until I have a signed copy, Thanks, Nuria Otero

## 2019-12-31 ENCOUNTER — DOCUMENTATION ONLY (OUTPATIENT)
Dept: ORTHOPEDIC SURGERY | Age: 49
End: 2019-12-31

## 2019-12-31 NOTE — PROGRESS NOTES
Patient came to [facility] today and brought all his paperwork requesting a Narrative Medical Report from Dr Juani Bocanegra. Dr Juani Bocanegra has dictated a letter and it is on his desk for his signature. He is going to give to Mary Cook when signed and she is going to send to [facility] and send patient a copy and scan in CC.

## 2020-01-07 ENCOUNTER — DOCUMENTATION ONLY (OUTPATIENT)
Dept: ORTHOPEDIC SURGERY | Age: 50
End: 2020-01-07

## 2020-01-07 NOTE — PROGRESS NOTES
Spoke to patient he is coming to  his copy of letter.  It is in the medical records file at the  at [address].

## 2020-01-07 NOTE — PROGRESS NOTES
DR. Armand Longoria COMPLETED LETTER FOR PATIENT. MAILED COPIES TO [employer] AND THE [facility] STORE. I ALSO FAXED A COPY TO CARA ATTBLAISE DICKEY/KINDRA RAMIREZ.   COPY SENT TO SCAN AND [facility] IS CALLING PATIENT TO COME  A COPY, [address], [address]

## 2020-02-23 DIAGNOSIS — M54.16 RIGHT LUMBAR RADICULOPATHY: ICD-10-CM

## 2020-02-24 NOTE — TELEPHONE ENCOUNTER
I called and spoke with Mr. Tim Dash. The pt was identified using 2 pt identifiers. He was asked about how he is taking his medication. He verbalized that he is supposed to be taking his medication 3 times a day but has only been able to take it once a day. According to the pt, the [address] stopped taking his insurance. He tried to have his prescription transferred to the [facility] on [address] [facility], but Mind[facility] to a [facility] on [address]. The pt states that this is too far for him. He is requesting a new prescription from the office to be sent to the [facility] location. Please advise.

## 2020-02-24 NOTE — TELEPHONE ENCOUNTER
Attempted to contact the pt on all 3 numbers listed and was not able to reach him. No messages were left on any number due to multiple different reasons. See outgoing calls.

## 2020-02-24 NOTE — TELEPHONE ENCOUNTER
Call pt and find out how he has been taking the gabapentin 300 mg.   It is ordered tid, but he last filled it in Nov for #90 and would have run out in Dec.

## 2020-02-25 RX ORDER — GABAPENTIN 300 MG/1
300 CAPSULE ORAL 3 TIMES DAILY
Qty: 90 CAP | Refills: 1 | Status: SHIPPED | OUTPATIENT
Start: 2020-02-25 | End: 2020-06-02 | Stop reason: SDUPTHER

## 2020-04-06 ENCOUNTER — TELEPHONE (OUTPATIENT)
Dept: ORTHOPEDIC SURGERY | Age: 50
End: 2020-04-06

## 2020-04-06 NOTE — TELEPHONE ENCOUNTER
Pt called and would like a handicap sticker for work. He works at [employer] and is walking 20-30 mins a day.  Please call

## 2020-04-07 NOTE — TELEPHONE ENCOUNTER
Called patient [phone] and verified his name and date of birth. I Informed patient that the provider has approved his request the DMV placard and he could come and pick it up from our [facility]. Patient states he gets off at 4 PM and he will stop by tomorrow and pick it up or call our office and let us know what when he will get here. Patient verbalized understanding. No further action needed at this time.

## 2020-05-27 ENCOUNTER — OFFICE VISIT (OUTPATIENT)
Dept: ORTHOPEDIC SURGERY | Facility: CLINIC | Age: 50
End: 2020-05-27

## 2020-05-27 VITALS
BODY MASS INDEX: 32.88 KG/M2 | WEIGHT: 270 LBS | SYSTOLIC BLOOD PRESSURE: 109 MMHG | TEMPERATURE: 98.3 F | HEART RATE: 71 BPM | HEIGHT: 76 IN | DIASTOLIC BLOOD PRESSURE: 70 MMHG

## 2020-05-27 DIAGNOSIS — M79.642 BILATERAL HAND PAIN: ICD-10-CM

## 2020-05-27 DIAGNOSIS — M79.641 BILATERAL HAND PAIN: ICD-10-CM

## 2020-05-27 DIAGNOSIS — M18.12 PRIMARY OSTEOARTHRITIS OF FIRST CARPOMETACARPAL JOINT OF LEFT HAND: Primary | ICD-10-CM

## 2020-05-27 DIAGNOSIS — G56.03 BILATERAL CARPAL TUNNEL SYNDROME: ICD-10-CM

## 2020-05-27 NOTE — PROGRESS NOTES
Isidoro Lutz is a 45 y.o. male right handed unknown employment. Worker's Compensation and legal considerations: none filed. Vitals:    05/27/20 1515   BP: 109/70   Pulse: 71   Temp: 98.3 °F (36.8 °C)   Weight: 270 lb (122.5 kg)   Height: 6' 4\" (1.93 m)   PainSc:   7   PainLoc: Hand           Chief Complaint   Patient presents with    Hand Pain     left         HPI: Left thumb base pain and numbness in left hand especially with wrist flexion. Reports occasional numbness in right hand     Date of onset:  indeterminate    Injury: No    Prior Treatment:  Yes: Comment: bilateral carpal tunnel braces in the past and injections    Numbness/ Tingling: Yes: Comment: bilateral hands      ROS: Review of Systems - General ROS: negative  Psychological ROS: negative  ENT ROS: negative  Allergy and Immunology ROS: negative  Hematological and Lymphatic ROS: negative  Respiratory ROS: no cough, shortness of breath, or wheezing  Cardiovascular ROS: no chest pain or dyspnea on exertion  Gastrointestinal ROS: no abdominal pain, change in bowel habits, or black or bloody stools  Musculoskeletal ROS: positive for - pain in thumb - left  Neurological ROS: positive for - numbness/tingling  Dermatological ROS: negative    Past Medical History:   Diagnosis Date    Hypertension     Stroke [facility])        History reviewed. No pertinent surgical history. Current Outpatient Medications   Medication Sig Dispense Refill    triamcinolone acetonide (KENALOG) 10 mg/mL injection 1 mL by Intra artICUlar route once for 1 dose. 1 Vial 0    gabapentin (NEURONTIN) 300 mg capsule Take 1 Cap by mouth three (3) times daily. Max Daily Amount: 900 mg. 90 Cap 1    METHOCARBAMOL PO Take  by mouth.  naproxen (NAPROSYN) 250 mg tablet Take  by mouth two (2) times daily (with meals).  aspirin 81 mg chewable tablet Take 1 Tab by mouth daily. 30 Tab 0    amLODIPine-benazepril (LOTREL) 5-20 mg per capsule Take 1 Cap by mouth daily.       rosuvastatin (CRESTOR) 20 mg tablet Take 20 mg by mouth nightly. No Known Allergies        PE:     Physical Exam  Vitals signs and nursing note reviewed. Constitutional:       General: He is not in acute distress. Appearance: Normal appearance. He is normal weight. He is not ill-appearing or toxic-appearing. HENT:      Head: Normocephalic and atraumatic. Neurological:      Mental Status: He is alert. Hand:    Examination L Digit(s) R Digit(s)   1st CMC Tenderness +  -    1st CMC Grind +  -    Arlene Nodes -  -    Heberden Nodes -  -    A1 Pulley Tenderness -  -    Triggering -  -    UCL Instability -  -    RCL Instability -  -    Lateral Stress Pain -  -    Palmar Cords -  -    Tabletop test -  -    Garrod's Pads -  -     Strength       Pinch Strength         ROM: Full    NEUROVASCULAR    Examination L R Examination L R   Carpal Comp. ? ? Pronator Comp. - -   Carpal Tinel ? ? Pronator Tinel - -   Phalen's ? ? Pronator Stress - -   Cubital Comp. - - Guyon Comp. - -   Cubital Tinel - - Guyon Tinel - -   Elbow Hyperflexion - - Adson's - -   Spurling's - - SC Comp. - -   PCB Median abn - - SC Tinel - -   Radial Tinel - - IC Comp. - -   Digital Tinel - - IC Tinel - -   Radial 2-Pt WNL WNL Ulnar 2-Pt WNL WNL     Radial Pulse: 2+  Capillary Refill: < 2 sec  Duong: Not Performed  Digital Duong: Not Performed        Imagin2020 plain films are + for left thumb CMC OA eaton stage 3-4        ICD-10-CM ICD-9-CM    1. Primary osteoarthritis of first carpometacarpal joint of left hand M18.12 715.14 TRIAMCINOLONE ACETONIDE INJ      triamcinolone acetonide (KENALOG) 10 mg/mL injection      DRAIN/INJECT SMALL JOINT/BURSA      AMB SUPPLY ORDER      CANCELED: AMB SUPPLY ORDER   2. Bilateral carpal tunnel syndrome G56.03 354.0 EMG TWO EXTREMITIES UPPER      NCV/LAT MOTOR PER NERVE UP/RT      NCV/LAT MOTOR PER NERVE UP/LT      CANCELED: AMB SUPPLY ORDER   3.  Bilateral hand pain M79.641 729.5 AMB POC XRAY, WRIST; COMPLETE, 3+ VIE    M79.642  AMB POC XRAY, WRIST; COMPLETE, 3+ VIE         Plan:     Left Thumb CMC Joint Injection     Left thumb cool comfort brace    Bilateral CT braces    Bilateral UE EMGs    Follow-up and Dispositions    · Return for EMG Follow-up. Plan was reviewed with patient, who verbalized agreement and understanding of the plan    [facility] NOTE        Chart reviewed for the following:   Jeffrey VIRGEN DO, have reviewed the History, Physical and updated the Allergic reactions for Ilex Markie Blare performed immediately prior to start of procedure:   Jeffrey VIRGEN DO, have performed the following reviews on Sheri Carey prior to the start of the procedure:            * Patient was identified by name and date of birth   * Agreement on procedure being performed was verified  * Risks and Benefits explained to the patient  * Procedure site verified and marked as necessary  * Patient was positioned for comfort  * Consent was signed and verified     Time: 15:45      Date of procedure: 5/27/2020    Procedure performed by: Peyman Bermudez DO    Provider assisted by: Cathy Sharp LPN    Patient assisted by: self    How tolerated by patient: tolerated the procedure well with no complications    Post Procedural Pain Scale: 0 - No Hurt    Comments: none    Procedure:  After consent was obtained, using sterile technique the joint was prepped. Local anesthetic used: 1% lidocaine. Kenalog 5 mg and was then injected and the needle withdrawn. The procedure was well tolerated. The patient is asked to continue to rest the area for a few more days before resuming regular activities. It may be more painful for the first 1-2 days. Watch for fever, or increased swelling or persistent pain in the joint. Call or return to clinic prn if such symptoms occur or there is failure to improve as anticipated.

## 2020-05-27 NOTE — PATIENT INSTRUCTIONS
Learning About Arthritis at the [facility] of the Thumb What is it? Arthritis at the base of the thumb joint is wear and tear on the cartilage. Cartilage is a firm, thick, slippery tissue. It covers and protects the ends of bones where they meet to form a joint. When you have arthritis, there are changes in the cartilage that cause it to break down. The bones rub together and cause joint damage and pain. What causes it? Experts don't know what causes arthritis at the base of the thumb. But aging, a lot of use, an injury, or family history may play a part. What are the symptoms? Symptoms of arthritis at the base of the thumb include aching in your joint. Or the pain may feel burning or sharp. You may feel clicking, creaking, or catching in the joint. It may get stiff. You may have more pain and less strength when you pinch or  things. Symptoms may come and go, stay the same, or get worse over time. How is it diagnosed? Your doctor can often diagnose arthritis by asking you questions about your joint pain and other symptoms and examining you. You may also have X-rays and blood tests. Blood tests can help make sure another disease isn't causing your symptoms. How is it treated? Arthritis at the base of your thumb may be treated with rest, pain relievers, steroid medicines, using a brace or splint, andin some casessurgery. To help relieve pain in the joint, rest your sore hand. Switch hands for some activities. You can try heat and cold therapy, such as hot compresses, paraffin wax, cold packs, or ice massage. Your doctor may give you a splint to wear during some activities or when pain flares up. You can often manage mild or moderate arthritis pain with over-the-counter pain relievers. These include medicines that reduce swelling, such as ibuprofen or naproxen. You can also use acetaminophen. Sometimes these medicines are in creams that you can rub on your thumb and hand.  Your doctor may also prescribe other medicine for your pain. For some people, steroid shots may be an option. If none of the treatments work, your doctor may discuss surgery with you. Follow-up care is a key part of your treatment and safety. Be sure to make and go to all appointments, and call your doctor if you are having problems. It's also a good idea to know your test results and keep a list of the medicines you take. Where can you learn more? Go to http://elizabeth-adelaida.info/ Enter T110 in the search box to learn more about \"Learning About Arthritis at the [facility] of the Thumb. \" Current as of: December 8, 2019Content Version: 12.4 © 8578-9809 Healthwise, [employer]orporated. Care instructions adapted under license by [employer] (which disclaims liability or warranty for this information). If you have questions about a medical condition or this instruction, always ask your healthcare professional. [address] any warranty or liability for your use of this information.

## 2020-06-02 ENCOUNTER — OFFICE VISIT (OUTPATIENT)
Dept: ORTHOPEDIC SURGERY | Age: 50
End: 2020-06-02

## 2020-06-02 VITALS
HEART RATE: 71 BPM | WEIGHT: 268 LBS | HEIGHT: 76 IN | RESPIRATION RATE: 16 BRPM | DIASTOLIC BLOOD PRESSURE: 87 MMHG | BODY MASS INDEX: 32.63 KG/M2 | OXYGEN SATURATION: 99 % | SYSTOLIC BLOOD PRESSURE: 126 MMHG | TEMPERATURE: 98.7 F

## 2020-06-02 DIAGNOSIS — M54.16 RIGHT LUMBAR RADICULOPATHY: ICD-10-CM

## 2020-06-02 DIAGNOSIS — M51.26 HNP (HERNIATED NUCLEUS PULPOSUS), LUMBAR: Primary | ICD-10-CM

## 2020-06-02 RX ORDER — GABAPENTIN 300 MG/1
300 CAPSULE ORAL 3 TIMES DAILY
Qty: 90 CAP | Refills: 3 | Status: SHIPPED | OUTPATIENT
Start: 2020-06-02

## 2020-06-02 NOTE — PROGRESS NOTES
[address] presents today for   Chief Complaint   Patient presents with    Back Pain       Is someone accompanying this pt? no    Is the patient using any DME equipment during OV? no    Depression Screening:  3 most recent PHQ Screens 12/6/2019   Little interest or pleasure in doing things Not at all   Feeling down, depressed, irritable, or hopeless Not at all   Total Score PHQ 2 0         Coordination of Care:  1. Have you been to the ER, urgent care clinic since your last visit? no  Hospitalized since your last visit? no    2. Have you seen or consulted any other health care providers outside of the [address] [place] [address] since your last visit? Yes, orthopedics Include any pap smears or colon screening. None.  Colonoscopy scheduled for middle of [place]

## 2020-06-02 NOTE — LETTER
6/2/20 Patient: Maya Leiva YOB: 1970 Date of Visit: 6/2/2020 Michael Lemon MD 
[address] [place] [address] [id number] VIA Facsimile: [phone] Dear Michael Lemon MD, Thank you for referring Mr. Mark Black to [address] [facility] for evaluation. My notes for this consultation are attached. If you have questions, please do not hesitate to call me. I look forward to following your patient along with you.  
 
 
Sincerely, 
 
Mejia Tapia MD

## 2020-06-02 NOTE — PROGRESS NOTES
[address]  [address], [address]  [place], [address] [place] [address] Street  Phone: [phone]  Fax: [phone]  PROGRESS NOTE  Patient: Tereso Bolden                MRN: [id number]       SSN: [id number]  YOB: 1970        AGE: 45 y.o. SEX: male  Body mass index is 32.62 kg/m². PCP: Yahir Valdes MD  06/02/20    Chief Complaint   Patient presents with    Back Pain       HISTORY OF PRESENT ILLNESS, RADIOGRAPHS, and PLAN:     Mr. Tracy Haynes returns today. He is dramatically improved. He has some mild symptoms in his back and some numbness in his toe he has no nerve tension signs he has normal strength. He is functioning well at work. Point he is doing so well and has recovered so dramatically from his disc herniation that I feel he can return to work with his usual customary duties without restriction. He still is taking his gabapentin which helps some with some of the toe numbness and dysesthetic sensation that he has I have refilled that for him at this point I believe he has had maximum medical improvement I do not have any role for further interventions I would refill his medications at this time and then have him wean himself off if he needs it in the long-term medications can be provided by his primary care doctor. This dictation was created utilizing voice recognition software. Errors may be present. Past Medical History:   Diagnosis Date    Hypertension     Stroke [facility])        No family history on file. Current Outpatient Medications   Medication Sig Dispense Refill    gabapentin (NEURONTIN) 300 mg capsule Take 1 Cap by mouth three (3) times daily. Max Daily Amount: 900 mg. 90 Cap 1    aspirin 81 mg chewable tablet Take 1 Tab by mouth daily. 30 Tab 0    amLODIPine-benazepril (LOTREL) 5-20 mg per capsule Take 1 Cap by mouth daily.  rosuvastatin (CRESTOR) 20 mg tablet Take 20 mg by mouth nightly.  METHOCARBAMOL PO Take  by mouth.  naproxen (NAPROSYN) 250 mg tablet Take  by mouth two (2) times daily (with meals). No Known Allergies    No past surgical history on file. Past Medical History:   Diagnosis Date    Hypertension     Stroke [facility])        Social History     Socioeconomic History    Marital status: UNKNOWN     Spouse name: Not on file    Number of children: Not on file    Years of education: Not on file    Highest education level: Not on file   Occupational History    Not on file   Social Needs    Financial resource strain: Not on file    Food insecurity     Worry: Not on file     Inability: Not on file    Transportation needs     Medical: Not on file     Non-medical: Not on file   Tobacco Use    Smoking status: Never Smoker    Smokeless tobacco: Never Used   Substance and Sexual Activity    Alcohol use: Never     Alcohol/week: 0.0 standard drinks     Frequency: Never    Drug use: Never    Sexual activity: Not on file   Lifestyle    Physical activity     Days per week: Not on file     Minutes per session: Not on file    Stress: Not on file   Relationships    Social connections     Talks on phone: Not on file     Gets together: Not on file     Attends Jain service: Not on file     Active member of club or organization: Not on file     Attends meetings of clubs or organizations: Not on file     Relationship status: Not on file    Intimate partner violence     Fear of current or ex partner: Not on file     Emotionally abused: Not on file     Physically abused: Not on file     Forced sexual activity: Not on file   Other Topics Concern    Not on file   Social History Narrative    Not on file         REVIEW OF SYSTEMS:   CONSTITUTIONAL SYMPTOMS:  Negative. EYES:  Negative. EARS, NOSE, THROAT AND MOUTH:  Negative. CARDIOVASCULAR:  Negative. RESPIRATORY:  Negative. GENITOURINARY: Per HPI. GASTROINTESTINAL:  Per HPI. INTEGUMENTARY (SKIN AND/OR BREAST):  Negative.    MUSCULOSKELETAL: Per HPI.   ENDOCRINE/RHEUMATOLOGIC:  Negative. NEUROLOGICAL:  Per HPI. HEMATOLOGIC/LYMPHATIC:  Negative. ALLERGIC/IMMUNOLOGIC:  Negative. PSYCHIATRIC:  Negative. PHYSICAL EXAMINATION:   Visit Vitals  /87 (BP 1 Location: Left arm, BP Patient Position: Sitting)   Pulse 71   Temp 98.7 °F (37.1 °C) (Oral)   Resp 16   Ht 6' 4\" (1.93 m)   Wt 268 lb (121.6 kg)   SpO2 99% Comment: RA   BMI 32.62 kg/m²    PAIN SCALE: 2/10    CONSTITUTIONAL: The patient is in no apparent distress and is alert and oriented x 3. HEENT: Normocephalic. Hearing grossly intact. NECK: Supple and symmetric. no tenderness, or masses were felt. RESPIRATORY: No labored breathing. CARDIOVASCULAR: The carotid pulses were normal. Peripheral pulses were 2+. CHEST: Normal AP diameter and normal contour without any kyphoscoliosis. LYMPHATIC: No lymphadenopathy was appreciated in the neck, axillae or groin. SKIN:Negative for scars, rashes, lesions, or ulcers on the right upper, right lower, left upper, left lower and trunk. NEUROLOGICAL: Alert and oriented x 3. Ambulation without assistive device. FWB. EXTREMITIES: See musculoskeletal.  MUSCULOSKELETAL:   Head and Neck:  Negative for misalignment, asymmetry, crepitation, defects, tenderness masses or effusions.  Left Upper Extremity: Inspection, percussion and palpation performed. Hoangs sign is negative.  Right Upper Extremity: Inspection, percussion and palpation performed. Hoangs sign is negative.  Spine, Ribs and Pelvis: Inspection, percussion and palpation performed. Negative for misalignment, asymmetry, crepitation, defects, tenderness masses or effusions.  Left Lower Extremity: Inspection, percussion and palpation performed. Negative straight leg raise.  Right Lower Extremity: 1st digit pain and numbness in back of foot. Inspection, percussion and palpation performed. Negative straight leg raise. SPINE EXAM:     Cervical spine: Neck is midline. Normal muscle tone. No focal atrophy is noted. Lumbar spine: No rash, ecchymosis, or gross obliquity. No focal atrophy is noted. ASSESSMENT    ICD-10-CM ICD-9-CM    1. HNP (herniated nucleus pulposus), lumbar, RL5/S1 M51.26 722.10        Written by Boby Dubin, as dictated by Ben Smart MD.    I, Dr. Ben Smart MD, confirm that all documentation is accurate.

## 2020-06-02 NOTE — LETTER
6/2/2020 2:31 PM 
 
Mr. Mark Baires [address] [facility], [place] [address] To Whom It May Concern: 
 
Mark Mckinnon is currently under the care of [address] [place] [address]. He may return to work on full duty. If there are questions or concerns please have the patient contact our office.  
 
 
 
Sincerely, 
 
 
 
 
 
 
 
 
 
 
Silvia Sanchez MD

## 2020-06-11 ENCOUNTER — HOSPITAL ENCOUNTER (OUTPATIENT)
Dept: PHYSICAL THERAPY | Age: 50
Discharge: HOME | End: 2020-06-11

## 2020-06-11 NOTE — PROGRESS NOTES
[facility]  [place] [employer] [facility] [employer] [facility]  [address] [facility]  [phone] fax    Plan of Care/ Statement of Necessity for Speech Therapy Services    Patient name: Noah Gilford Start of Care: 2020   Referral source: Amaya Christine MD : 1970    Medical Diagnosis: Cerebral infarction due to thrombosis of bilateral carotid arteries [I63.033]  Payor: [facility] MEDICAID / Plan: ABDIAS ARAUJO [facility] / Product Type: Managed Care Medicaid /  Onset Date: 2020    Treatment Diagnosis: R13.13 pharyngeal dysphagia   Prior Hospitalization: see medical history Provider#: [id number]   Medications: Verified on Patient summary List    Comorbidities: HTN; arthritis; hx of CVA   Prior Level of Function: Pt reports he was previously receiving ST in 2018 s/p CVA for speech/language; appears WFLs this date. No reported hx of dyshagia, dysphonia, cognitive deficits. The Plan of Care and following information is based on the information from the initial evaluation. Assessment/ key information: This 46 y/o male was referred to O/P ST d/t new onset of reported dysphagia over \"the past few weeks\" with solids, liquids, and saliva; he confirms globus sensation as well. He states that at the end of meals he intermittently has to cough to attempt clearing all po. Pt denies any GERD/LPR. He also reports, his wife c/o his voice being softer than it used to be, and has to repeat himself often. Today in a quiet tx room, pt's voice presents perceptually soft in nature, but intelligible. Today a swallow evaluation and teaching session were conducted. Patient presents A/Ox4 and able to follow commands. He presents with adeqaute oral hygiene and significant natural dentition. Oral mech exam revealed all structures WNLs for speech/deglutition. SLP noted absent gag reflex and perceputually soft vocal intensity. He was able to produce a strong volitional throat clear and cough.     Pt observed with thin liquids +/- straw via single sips and successive swallows with timely swallow initiation, adequate laryngeal elevation to palpation and no overt s/sx aspiration; no change in VQ assessed. Pt accept puree via tsp, mech soft solids, and dry regular solids. Pt demo'd effective oral prep with adequate bolus formation/manipulation. Positive rotary chew and thorough oral clearance  Observed with all solids without any overt s/sx aspiration/penetration. Per palpation and observation pt utilized double swallows across all consistencies and c/o po \"slowly moving\" after swallow. Globus sensation  C/o this date with all solids, but improved given mod cues to utilize dry re-swallows. Based on today's evaluation, pt presents with mild pharyngeal dysphagia with questionable esophageal involvement. SLP Recs Modified Barium Swallow Study (MBS) to further assess pharyngeal structure/ function, r/o silent aspiration, and r/o obstruction/narrowing. Pt safe for soft solid diet with thin liquids, meds as tolerated with general safe swallow precautions. Comp strategies rec: small bites/sips, sitting upright at 90* angle with remaining upright > 30 mins after meals; utilize dry re-swallows and alternate solids/liquids after attempts of re-swallows. Pt educated with regard to s/sx aspiration, aspiration risk, diet recs and role of SLP qith handout given. MBS order requested from referring MD. SLP  Pt able to verbalize understanding. It is recommended that he receive skilled speech therapy services as it is medically necessary to improve his swallow function for safe po intake and QOL.           Problem List:      []aphasic  []dysarthric  [x]dysphagic       []alexic  []agraphic  []dysphonia       []dysfluency   []Cognitive-Linguistic Disorder       []other   Treatment Plan may include any combination of the following: Dysphagia Treatment, Treatment of Swallowing and Patient Education      Patient / Family readiness to learn indicated by: asking questions, trying to perform skills and interest    Persons(s) to be included in education:   patient (P) and family support person (FSP);list spouse    Barriers to Learning/Limitations: None    Patient Goal (s): imake it easier to swallow\". Patient Self Reported Health Status: good    Rehabilitation Potential: good    Short Term Goals: To be accomplished in 4 weeks  1. Patient will complete laryngeal/pharyngeal strengthening exercises (including Mendelsohn maneuver, modified Shaker with CTAR ball,  effortful swallow +/- po trials, supraglottic swallow, high/eee/ etc.), with min cues in 3/3 sessions in order to improve the strength/ agility/efficiency of his swallow to decrease laryngeal stasis for improved swallowing safety and QOL. 2. Patient will recall/demonstrate aspiration precautions and safe swallowing strategies with 100% acc when provided with Faustina in 3/3 sessions (small sips/bites; double swallow; alternate liquids/solids; slow rate; oral care 2-3x daily; Sit upright at 90 degree angle during po trials and for 30 minute post po intake)  to decrease the reported incidences of dysphagia, globus sensation and s/sx of aspiration.      3. Patient will tolerate po trials of regular solids with thin liquids using comp strategies without s/sx of aspiration/ penetration or globus sensation when provided with Faustina in 3/3 sessions to promote adequaute and efficient nutrition/hydration for QOL.    Long Term Goals: To be accomplished in 4 weeks   1. Patient will consume a least restrictive diet utilizing compensatory swallowing strategies with tk in 3/3trials  without s/s of aspiration/ penetration to promote QOL, enhance hydration/ nutrition status, and reduce risk of aspiration.      Frequency / Duration: Patient to be seen 1-2  times per week for 4 weeks:    Patient/ Caregiver education and instruction: Diagnosis, prognosis, diet recs/comp strategies, role of SLP, and purpose/benefits of MBS    Irasema Blake 6/11/2020 10:36 AM  MA, CCC-SLP  Speech-Language Pathologist    ________________________________________________________________________    I certify that the above Therapy Services are being furnished while the patient is under my care. I agree with the treatment plan and certify that this therapy is necessary.     Physician's Signature:____________Date:_________TIME:________    ** Signature, Date and Time must be completed for valid certification **    Please sign and return to [facility]  [employer]  [address] [place] [address]  [phone] fax     Thank you

## 2020-06-11 NOTE — PROGRESS NOTES
ST DAILY TREATMENT NOTE    Patient Name: Haseeb Adan  Date:2020  : 1970  [x]  Patient  Verified  Payor: [facility] MEDICAID / [facility] / Product Type: Managed Care Medicaid /   In time: 940  Out time:1015  Total Treatment Time (min): 35  Visit #: 1 of 8    SUBJECTIVE  Pain Level (0-10 scale): 0  Any medication changes, allergies to medications, adverse drug reactions, diagnosis change, or new procedure performed?: [] No    [x] Yes (see summary sheet for update)  Subjective functional status/changes:   [] No changes reported  Pt referred to O/P ST d/t new onset of reported dysphagia over \"the past few weeks\" with solids, liquids, and saliva; he confirms globus sensation as well. He states that at the end of meals he intermittently has to cough to attempt clearing all po. Pt denies any GERD/LPR. He also reports, his wife c/o his voice being softer than it used to be, and has to repeat himself often. Today in a quiet tx room, pt's voice presents perceptually soft in nature, but intelligible. Date of Onset: 2020  Social History: ; Pt was working at [employer], however, reports he is on admin leave d/t  COVID-19  Prior Functional level: Pt reports he was previously receiving ST in 2018 s/p CVA for speech/language. Radiology: n/a  Current diet: Pt reports soft rico/thin liquids  positionin*  Mental Status:  [x]alert []lethargic []confused  Orientation: [x]person [x]place [x]time [x]situation  AC Directions: [x]1-step [x]2-step [x]3-step []complex  Motivation: []excellent [x]good  []fair  []poor   Barriers to learning: [x]none []aphasia []? cognition []lethargy/motivation [][place]   []?vision []language []Fatigue/pain []psych factors compensate with:   Dentition: [x]normal []abnormal []edentulous []dentures   Respiratory Status: [x]WFL []SOB  []O2 L/min:  []NC []Mask               []Trach Tube:                     []Excess secretions  Lips:  [x]Symmetrical []asymmetrical  Retraction [x]WFL  []?min []?mod []?max  Protrusion [x]WFL  []?min []?mod []?max  Strength [x]WFL  []?min []?mod []?max  Puff  [x]WFL  []?min []?mod []?max  Tongue:  [x]Symmetrical []asymmetrical  Protrusion [x]WFL  []?min []?mod []?max  Elevation [x]WFL  []?min []?mod []?max  Depression [x]WFL  []?min []?mod []?max  Lateralization [x]WFL  []?min []?mod []?max  Strength []WFL  []?min []?mod []?max  Velum:  [x]Symmetrical []asymmetrical  Gag Reflex:  []Present [x]absent []DNT      Specify: Voice:  []Normal []Hoarse []harsh  []Breathy []Hypernasal []hyponasal  []Gurgly Other: low intensity  Swallow:  []Volitional []absent  []Reflexive []absent  Cough   Strength : [x]WNL []Diminished  [x]Volitional []absent  []Reflexive []absent did not witness  OBJECTIVE    OP SWALLOW EVALUATION    Observation of Swallow:  Thin Nectar Honey Puree  applesauce Solids  Mechanical soft fruit and grain bar/regular peanut butter cracker nabs Other  Mixed consistency canned fruit cocktail   Oral Phase X   X X    Anterior loss of bolus  (decreased labial seal [])         Decreased bolus formation  (?lingual ROM/Coord[])         Increased mastication         Increased oral transit time  (?A-P bolus transit[])         Abnormal chewing         Tongue pumping/tremors         Pocketing  (?labial/buccal tension/lingual control)         Other         Oral Pharyngeal Phase         Delayed swallow initiation         Absent swallow         Stasis on lingual surface  (?lingual movement)         Adherence to hard palate   (? lingual elevation)         Pharyngeal Phase         Multiple swallows  (residue in pharynx []) X   X X    Coughing post swallow         Throat clear post swallow         Wet vocal quality  (residue on vocal cords [])         Reduced laryngeal elevation         Nasal Regurgitation  (? velopharyngeal closure)         Other           [] No symptoms of dysphagia evidenced  [x] Symptoms of dysphagia observed  [] Patient at risk for aspiration  [x] Other: Pt reports applesauce felt the best with regard to pharyngeal clearance sensation.      Recommendations:   Diet:  [] NPO    [] Pureed [] Ground [] MechSoft [x] Regular (soft solids)  Liquids: [x] Water  [x] Regular [] Thickened   [] [place]  [] Honeythick  [] Pudding  Presentation: [x] Cup    [] Spoon [x] Straw [x] Alternate liquids and solids (prn, after dry re-swallows)  Monitor: [x] Sitting up at 90 deg [] Reclined to:  [] Head turned to:    [] Chin tuck  [] Head tilt to:      [] Seated upright post meals (min):  Document: [x] Coughing [x] Temperatures [] Lung Sounds    Videoflouroscopy: [x] Yes [] No  Dysphagia Treatment: [x] Yes [] No Sessions per week: 1-2x  Other:    Remediation Techniques:  C = Compensatory techniques to use during meal      F = Facilitation/treatment techniques by SLP    [x] Supraglottic Swallow (c,f)    [] Oral motor exercises (f)  [] Super-supraglottic swallow (c,f)   [] Labial closure  [] Compensations for pocketing (c)   [] Lingual elevation  [] Sweep mouth with tongue    [] Lingual lateralization  [] Sweep mouth with finger    [] Lingual anterior-posterior  [] External pressure to check   [] Lingual base of tongue  [] Rinse mouth/expel after meal   [] Vocal Fold Exercises (f)  [] Alternate liquid swallows every _ bites (c)  [] Falsetto/laryngeal elevation exercises (f)  [] Discourage liquid wash between bites (c)  [] Thermal application (c,f)  [x] Multiple swallows     [] Sour bolus (f)  [x] Patient needs cues     [] Cold bolus (f)  [] Patient does not need cues   [] Pharyngeal exercise (f)  [x] Mendelsohn Maneuver (c,f)    [] Breath hold  [] Encourage/stimulate lip closure (c)   [x] Effortful Swallow (c,f)  [] Empty mouth before next bite (c)   [] Tongue base retraction  [] Cue patient to slow down (c)    [] Tongue hold  [] Encourage coughing (c)    [] Laryngeal closure  []Chin tuck (c)  []Head turn  (c)  []Head turn , chin tuck (mariah)    Patient/Caregiver instruction/education: [x] Review HEP    [] Progressed/Changed    HEP/Handouts given: Recs/comp strategies   Pain Level (0-10 scale) post treatment: 0    ASSESSMENT  [x]  See Plan of Care    Short Term Goals: To be accomplished in 4 weeks  1. Patient will complete laryngeal/pharyngeal strengthening exercises (including Mendelsohn maneuver, modified Shaker with CTAR ball,  effortful swallow +/- po trials, supraglottic swallow, high/eee/ etc.), with min cues in 3/3 sessions in order to improve the strength/ agility/efficiency of his swallow to decrease laryngeal stasis for improved swallowing safety and QOL. 2. Patient will recall/demonstrate aspiration precautions and safe swallowing strategies with 100% acc when provided with Faustina in 3/3 sessions (small sips/bites; double swallow; alternate liquids/solids; slow rate; oral care 2-3x daily; Sit upright at 90 degree angle during po trials and for 30 minute post po intake)  to decrease the reported incidences of dysphagia, globus sensation and s/sx of aspiration. 3. Patient will tolerate po trials of regular solids with thin liquids using comp strategies without s/sx of aspiration/ penetration or globus sensation when provided with Faustina in 3/3 sessions to promote adequaute and efficient nutrition/hydration for QOL. Long Term Goals: To be accomplished in 4 weeks   1. Patient will consume a least restrictive diet utilizing compensatory swallowing strategies with tk in 3/3trials  without s/s of aspiration/ penetration to promote QOL, enhance hydration/ nutrition status, and reduce risk of aspiration. PLAN  [x]  Upgrade activities as tolerated     [x]  Continue plan of care  []  Discharge due to:__  [x] Other:__Rec [facility] to furbrady assess pharyngeal structure/function.     Krystyna Barajas SLP 6/11/2020  9:42 AM  MA, CCC-SLP  Speech-Language Pathologist

## 2020-06-22 ENCOUNTER — HOSPITAL ENCOUNTER (OUTPATIENT)
Dept: GENERAL RADIOLOGY | Age: 50
Discharge: HOME | End: 2020-06-22
Payer: MEDICAID

## 2020-06-22 DIAGNOSIS — R13.10 DYSPHAGIA: ICD-10-CM

## 2020-06-22 DIAGNOSIS — R13.13 PHARYNGEAL DYSPHAGIA: ICD-10-CM

## 2020-06-22 PROCEDURE — 74230 X-RAY XM SWLNG FUNCJ C+: CPT

## 2020-06-22 PROCEDURE — 92611 MOTION FLUOROSCOPY/SWALLOW: CPT

## 2020-06-22 PROCEDURE — 74011000255 HC RX REV CODE- 255

## 2020-06-22 RX ADMIN — BARIUM SULFATE 700 MG: 700 TABLET ORAL at 10:50

## 2020-06-22 RX ADMIN — BARIUM SULFATE 90 G: 960 POWDER, FOR SUSPENSION ORAL at 10:49

## 2020-06-22 RX ADMIN — BARIUM SULFATE 45 ML: 400 PASTE ORAL at 10:48

## 2020-06-22 NOTE — PROGRESS NOTES
[facility]  SPEECH LANGUAGE PATHOLOGY OUTPATIENT MODIFIED BARIUM SWALLOW STUDY    Patient: Ricky Lewis (45 y.o. male)  Date: 6/22/2020  Primary Diagnosis: Dysphagia [R13.10]       Precautions: aspiration       ASSESSMENT :  Based on the objective data described below, the patient presents with min pharyngeal dysphagia c/b trace penetration of thin liquids on 1/8 trials as well as during 13 mm Ba pill with thin liquid wash. He tolerated 7/8 thin liquid trials +/- straw, puree, and reg solids without aspiration/penetration events. Bolus manipulation, tongue base retraction, laryngeal elevation, and pharyngeal motility/sensation appeared functional/timely with positive oropharyngeal clearance across all trials. Recommend regular solid diet with thin liquids, small sips, aspiration precautions, oral care TID, and meds in puree. He may benefit from continuation of SLP services to ensure safety of PO. He may benefit from follow up with GI. Results/recommendations d/w pt and pt's wife with verbalized understanding. PLAN :    Recommendations:  Regular diet with thin liquids  Aspiration precautions  HOB >45 during po intake, remain >30 for 30-45 minutes after po   Small bites/sips; alternate liquid/solid with slow feeding rate   Oral care TID  Meds in puree    Recommend continuation of SLP services to further address dysphagia management. May benefit from referral or continuation of GI. SUBJECTIVE:   Patient stated Sometimes I cough/choke on my own saliva.     OBJECTIVE:     Past Medical History:   Diagnosis Date    Hypertension     Stroke [facility])      Past Surgical History:   Procedure Laterality Date    COLONOSCOPY N/A 6/18/2020    SCREENING COLONOSCOPY /c Bx Polypectomy performed by Peterson Albrecht MD at [address] prior to admission: reg solid with thin  Current Diet:  Reg solid with thin, meds in puree, small sips   Radiology:  Film Views: Lateral;Fluoro  Patient Position: 90 in fluoro chair    Trial 1:   Consistency Presented: Thin liquid; Solid;Puree;Pill/Tablet   How Presented: Self-fed/presented;Cup/sip;Spoon;Straw;Successive swallows       Bolus Acceptance: No impairment   Bolus Formation/Control: No impairment:     Propulsion: No impairment   Oral Residue: None   Initiation of Swallow: No impairment   Timing: No impairment   Penetration: Trace(1/8 thin liquid trials, 13 mm Ba pill with thin wash)   Aspiration/Timing: No evidence of aspiration   Pharyngeal Clearance: No residue   Attempted Modifications: Small sips and bites   Effective Modifications: Small sips and bites   Cues for Modifications: Minimal       Decreased Tongue Base Retraction?: No  Laryngeal Elevation: WFL (within functional limits)  Aspiration/Penetration Score: 2 (Penetration/No residue-Contrast enters the airway penetrates, remains above the folds/cords, and is cleared)  Pharyngeal Symmetry: Not assessed  Pharyngeal-Esophageal Segment: No impairment  Pharyngeal Dysfunction: None    Oral Phase Severity: No impairment  Pharyngeal Phase Severity: Minimal    8-point Penetration-Aspiration Scale: Score 2    PAIN:  Pt reports 0/10 pain or discomfort prior to MBS. Pt reports 0/10 pain or discomfort post MBS. COMMUNICATION/EDUCATION:   [x]  Education provided post diagnostic testing including oropharyngeal anatomy/physiology, MBS results, diet recommendations and        compensatory strategies/positioning. [x]  Video feedback utilized.      Thank you for this referral.    Karla Ardon M.S. CCC-SLP/L  Speech-Language Pathologist

## 2020-07-06 ENCOUNTER — PATIENT MESSAGE (OUTPATIENT)
Dept: ORTHOPEDIC SURGERY | Age: 50
End: 2020-07-06

## 2020-07-09 NOTE — TELEPHONE ENCOUNTER
----- Message from Denae Alvarenga NP sent at 2020  4:05 PM EDT -----  Regarding: FW: Non-Urgent Medical Question  Contact: [phone] [id number] Nupur Patel to approve a 3mo placard, pt can get future ones thru PCP  ----- Message -----  From: Omid Herron: 2020   9:06 PM EDT  To: o Nurses  Subject: Non-Urgent Medical Question                      Good evening Doctor, my Handicap sticker is about to  end of this month,I  would like to get it renew or extend , please help!

## 2020-07-09 NOTE — TELEPHONE ENCOUNTER
Will have CHRISTINA Flores sign, left voice message for pt that [facility] [place]. will be available for pickup tomorrow. Any further questions or concerns please call our office.

## 2020-07-17 ENCOUNTER — PATIENT MESSAGE (OUTPATIENT)
Dept: ORTHOPEDIC SURGERY | Age: 50
End: 2020-07-17

## 2020-07-24 NOTE — TELEPHONE ENCOUNTER
----- Message from Brook Oshea NP sent at 7/20/2020  8:33 AM EDT -----  Regarding: FW: Non-Urgent Medical Question  Contact: [phone]  See below, I think we can mail this but it might have a 2-3 wk turn around with the mail  ----- Message -----  From: Jeremiah Vazquez: 7/17/2020   5:13 PM EDT  To: o Nurses  Subject: Non-Urgent Medical Question                      Is it possible to mail me the application for the handicap sticker, I came on Friday after work to pick it up your office was already closed.

## 2020-07-24 NOTE — TELEPHONE ENCOUNTER
Left voice message for pt to return call confirming he would like it mailed with the 2-3 week delay. Will also send a mychart message.

## 2020-07-28 NOTE — TELEPHONE ENCOUNTER
Patient requesting to speak to nurse again regarding handicap placard. Requesting a call back at [phone].

## 2020-07-29 ENCOUNTER — TELEPHONE (OUTPATIENT)
Dept: ORTHOPEDIC SURGERY | Age: 50
End: 2020-07-29

## 2020-07-29 NOTE — TELEPHONE ENCOUNTER
----- Message from Rebecca Arnold NP sent at 7/20/2020  8:33 AM EDT -----  Regarding: FW: Non-Urgent Medical Question  Contact: [phone]  See below, I think we can mail this but it might have a 2-3 wk turn around with the mail  ----- Message -----  From: Marvin Mallory: 7/17/2020   5:13 PM EDT  To: [facility]  Subject: Non-Urgent Medical Question                      Is it possible to mail me the application for the handicap sticker, I came on Friday after work to pick it up your office was already closed.

## 2020-09-17 NOTE — PROGRESS NOTES
[facility] Vald[place] Speaks  [address]  [phone] fax    Speech Therapy Discharge Summary    Patient name: Lucinda Cedeno of Care: 2020   Referral source: Basilio Fine MD : 1970   Medical/Treatment Diagnosis: Cerebral infarction due to thrombosis of bilateral carotid arteries [I63.033]  Payor: [facility] MEDICAID / Plan: [facility] / Product Type: Managed Care Medicaid /  Onset Date:2020     Prior Hospitalization: see medical history Provider#: [id number]   Medications: Verified on Patient Summary List    Comorbidities: HTN; arthritis; hx of CVA   Prior Level of Function: Pt reports he was previously receiving ST in 2018 s/p CVA for speech/language; appears WFLs this date. No reported hx of dyshagia, dysphonia, cognitive deficits. Visits from Start of Care: 1    Missed Visits: 0    Reporting Period : 2020    Summary of Care:  Goal:1. Patient will complete laryngeal/pharyngeal strengthening exercises (including Mendelsohn maneuver, modified Shaker with CTAR ball,  effortful swallow +/- po trials, supraglottic swallow, high/eee/ etc.), with min cues in 3/3 sessions in order to improve the strength/ agility/efficiency of his swallow to decrease laryngeal stasis for improved swallowing safety and QOL. Status at last note/certification: At [facility], pt presents with mild pharyngeal dysphagia with questionable esophageal involvement. SLP Recs Modified Barium Swallow Study (MBS) to further assess pharyngeal structure/ function, r/o silent aspiration, and r/o obstruction/narrowing. Status at discharge: not met; patient is non-compliant/ has abdicated. Goal:2. Patient will recall/demonstrate aspiration precautions and safe swallowing strategies with 100% acc when provided with Faustina in 3/3 sessions (small sips/bites; double swallow; alternate liquids/solids; slow rate; oral care 2-3x daily;  Sit upright at 90 degree angle during po trials and for 30 minute post po intake)  to decrease the reported incidences of dysphagia, globus sensation and s/sx of aspiration. Status at last note/certification:At [facility], pt presents with mild pharyngeal dysphagia with questionable esophageal involvement. SLP Recs Modified Barium Swallow Study (MBS) to further assess pharyngeal structure/ function, r/o silent aspiration, and r/o obstruction/narrowing. Status at discharge:  not met; patient is non-compliant/ has abdicated. Goal:3. Patient will tolerate po trials of regular solids with thin liquids using comp strategies without s/sx of aspiration/ penetration or globus sensation when provided with Faustina in 3/3 sessions to promote adequaute and efficient nutrition/hydration for QOL. Status at last note/certification:At [facility], pt presents with mild pharyngeal dysphagia with questionable esophageal involvement. SLP Recs Modified Barium Swallow Study (MBS) to further assess pharyngeal structure/ function, r/o silent aspiration, and r/o obstruction/narrowing. Status at discharge:  not met; patient is non-compliant/ has abdicated. Long Term Goals: To be accomplished in [place]  1. Patient will consume a least restrictive diet utilizing compensatory swallowing strategies with tk in 3/3trials  without s/s of aspiration/ penetration to promote QOL, enhance hydration/ nutrition status, and reduce risk of aspiration. -Not met      ASSESSMENT:  MBS conducted on 6/22/2020,  Based on the objective data described below, the patient presents with min pharyngeal dysphagia c/b trace penetration of thin liquids on 1/8 trials as well as during 13 mm Ba pill with thin liquid wash. He tolerated 7/8 thin liquid trials +/- straw, puree, and reg solids without aspiration/penetration events.  Bolus manipulation, tongue base retraction, laryngeal elevation, and pharyngeal motility/sensation appeared functional/timely with positive oropharyngeal clearance across all trials. Recommend regular solid diet with thin liquids, small sips, aspiration precautions, oral care TID, and meds in puree. He may benefit from continuation of SLP services to ensure safety of PO. He may benefit from follow up with GI. Results/recommendations d/w pt and pt's wife with verbalized understanding.       PLAN :     Recommendations:  Regular diet with thin liquids  Aspiration precautions  HOB >45 during po intake, remain >30 for 30-45 minutes after po   Small bites/sips; alternate liquid/solid with slow feeding rate   Oral care TID  Meds in puree     Recommend continuation of SLP services to further address dysphagia management. May benefit from referral or continuation of GI. Pt did not return for tx following MBS. SLP will d/c and sign off at this time. RECOMMENDATIONS:  [x]Discontinue therapy: []Patient has reached or is progressing toward set goals      [x]Patient is non-compliant or has abdicated       []Due to lack of appreciable progress towards set goals    CARLY Zee 9/17/2020 12:41 PM  MA, [facility]-SLP  Speech-Language Pathologist    NOTE TO PHYSICIAN:  Please complete the following and fax to: [facility] at [phone]  . Retain this original for your records. If you are unable to process this request in   24 hours, please contact our office.      [] I have read the above report and request that my patient continue therapy with the following changes/special instructions:  [] I have read the above report and request that my patient be discharged from therapy    Physician's Signature:____________Date:_________TIME:________    ** Signature, Date and Time must be completed for valid certification **

## 2021-03-31 ENCOUNTER — OFFICE VISIT (OUTPATIENT)
Dept: ORTHOPEDIC SURGERY | Age: 51
End: 2021-03-31
Payer: MEDICAID

## 2021-03-31 VITALS
HEART RATE: 78 BPM | TEMPERATURE: 98.9 F | HEIGHT: 76 IN | OXYGEN SATURATION: 100 % | WEIGHT: 262.8 LBS | BODY MASS INDEX: 32 KG/M2

## 2021-03-31 DIAGNOSIS — M18.12 PRIMARY OSTEOARTHRITIS OF FIRST CARPOMETACARPAL JOINT OF LEFT HAND: ICD-10-CM

## 2021-03-31 DIAGNOSIS — G56.03 BILATERAL CARPAL TUNNEL SYNDROME: Primary | ICD-10-CM

## 2021-03-31 PROCEDURE — 20600 DRAIN/INJ JOINT/BURSA W/O US: CPT | Performed by: ORTHOPAEDIC SURGERY

## 2021-03-31 PROCEDURE — 99213 OFFICE O/P EST LOW 20 MIN: CPT | Performed by: ORTHOPAEDIC SURGERY

## 2021-03-31 NOTE — PROGRESS NOTES
Patria Ward is a 45 y.o. male right handed unknown employment. Worker's Compensation and legal considerations: none filed. Vitals:    03/31/21 1315   Pulse: 78   Temp: 98.9 °F (37.2 °C)   TempSrc: Temporal   SpO2: 100%   Weight: 262 lb 12.8 oz (119.2 kg)   Height: 6' 4\" (1.93 m)   PainSc:   4   PainLoc: Hand           Chief Complaint   Patient presents with    Hand Pain     left hand       HPI: Patient presents today for follow-up of his left thumb CMC arthritis as well as bilateral hand numbness and tingling. We previously ordered EMGs but they were never scheduled. He says the injection helped previously and he is requesting another thumb joint injection. Initial HPI: Left thumb base pain and numbness in left hand especially with wrist flexion. Reports occasional numbness in right hand     Date of onset:  indeterminate    Injury: No    Prior Treatment:  Yes: Comment: bilateral carpal tunnel braces in the past and injections    Numbness/ Tingling: Yes: Comment: bilateral hands      ROS: Review of Systems - General ROS: negative  Psychological ROS: negative  ENT ROS: negative  Allergy and Immunology ROS: negative  Hematological and Lymphatic ROS: negative  Respiratory ROS: no cough, shortness of breath, or wheezing  Cardiovascular ROS: no chest pain or dyspnea on exertion  Gastrointestinal ROS: no abdominal pain, change in bowel habits, or black or bloody stools  Musculoskeletal ROS: positive for - pain in thumb - left  Neurological ROS: positive for - numbness/tingling  Dermatological ROS: negative    Past Medical History:   Diagnosis Date    Hypertension     Stroke [facility])        Past Surgical History:   Procedure Laterality Date    COLONOSCOPY N/A 6/18/2020    SCREENING COLONOSCOPY /c Bx Polypectomy performed by Boyd Mendoza MD at [facility] ENDOSCOPY       Current Outpatient Medications   Medication Sig Dispense Refill    gabapentin (NEURONTIN) 300 mg capsule Take 1 Cap by mouth three (3) times daily. Max Daily Amount: 900 mg. (Patient taking differently: Take 300 mg by mouth daily as needed.) 90 Cap 3    naproxen (NAPROSYN) 250 mg tablet Take  by mouth two (2) times daily (with meals).  aspirin 81 mg chewable tablet Take 1 Tab by mouth daily. 30 Tab 0    amLODIPine-benazepril (LOTREL) 5-20 mg per capsule Take 1 Cap by mouth daily.  rosuvastatin (CRESTOR) 20 mg tablet Take 20 mg by mouth nightly. No Known Allergies        PE:     Physical Exam  Vitals signs and nursing note reviewed. Constitutional:       General: He is not in acute distress. Appearance: Normal appearance. He is normal weight. He is not ill-appearing. Neck:      Musculoskeletal: Normal range of motion. Cardiovascular:      Pulses: Normal pulses. Pulmonary:      Effort: Pulmonary effort is normal.   Musculoskeletal: Normal range of motion. General: Swelling and tenderness present. Skin:     General: Skin is warm and dry. Capillary Refill: Capillary refill takes less than 2 seconds. Findings: No bruising or erythema. Neurological:      General: No focal deficit present. Mental Status: He is alert and oriented to person, place, and time. Cranial Nerves: No cranial nerve deficit. Sensory: No sensory deficit. Psychiatric:         Mood and Affect: Mood normal.         Behavior: Behavior normal.            Hand:    Examination L Digit(s) R Digit(s)   1st CMC Tenderness +  -    1st CMC Grind +  -    Arlene Nodes -  -    Heberden Nodes -  -    A1 Pulley Tenderness -  -    Triggering -  -    UCL Instability -  -    RCL Instability -  -    Lateral Stress Pain -  -    Palmar Cords -  -    Tabletop test -  -    Garrod's Pads -  -     Strength       Pinch Strength         ROM: Full    NEUROVASCULAR    Examination L R Examination L R   Carpal Comp. ? ? Pronator Comp. - -   Carpal Tinel ? ? Pronator Tinel - -   Phalen's ? ? Pronator Stress - -   Cubital Comp. - - Guyon Comp.  - - Cubital Tinel - - Guyon Tinel - -   Elbow Hyperflexion - - Adson's - -   Spurling's - - SC Comp. - -   PCB Median abn - - SC Tinel - -   Radial Tinel - - IC Comp. - -   Digital Tinel - - IC Tinel - -   Radial 2-Pt WNL WNL Ulnar 2-Pt WNL WNL     Radial Pulse: 2+  Capillary Refill: < 2 sec  Duong: Not Performed  Digital Duong: Not Performed        Imagin2020 plain films are + for left thumb CMC OA eaton stage 3-4        ICD-10-CM ICD-9-CM    1. Bilateral carpal tunnel syndrome  G56.03 354.0 EMG TWO EXTREMITIES UPPER      NCV/LAT MOTOR PER NERVE UP/RT      NCV/LAT MOTOR PER NERVE UP/LT   2. Primary osteoarthritis of first carpometacarpal joint of left hand  M18.12 715.14 DRAIN/INJECT SMALL JOINT/BURSA      triamcinolone acetonide (KENALOG) 10 mg/mL injection 5 mg         Plan:     Repeat left thumb CMC joint injection and continue cool comfort brace during the day. Continue nighttime braces and we will reorder bilateral upper extremity EMGs today. Follow-up and Dispositions    · Return for EMG Review. Plan was reviewed with patient, who verbalized agreement and understanding of the plan     [facility] NOTE        Chart reviewed for the following:   Jeffrey VIRGEN DO, have reviewed the History, Physical and updated the Allergic reactions for Ilex Marilyn Pressley performed immediately prior to start of procedure:   Jeffrey VIRGEN DO, have performed the following reviews on Vernadine Backers prior to the start of the procedure:            * Patient was identified by name and date of birth   * Agreement on procedure being performed was verified  * Risks and Benefits explained to the patient  * Procedure site verified and marked as necessary  * Patient was positioned for comfort  * Consent was signed and verified     Time: 13:38      Date of procedure: 3/31/2021    Procedure performed by:   Skip Nieves DO    Provider assisted by: Ace Mathews LPN    Patient assisted by: self    How tolerated by patient: tolerated the procedure well with no complications    Post Procedural Pain Scale: 0 - No Hurt    Comments: none    Procedure:  After consent was obtained, using sterile technique the left thumb CMC joint was prepped. Local anesthetic used: 1% lidocaine. Kenalog 5 mg and was then injected and the needle withdrawn. The procedure was well tolerated. The patient is asked to continue to rest the area for a few more days before resuming regular activities. It may be more painful for the first 1-2 days. Watch for fever, or increased swelling or persistent pain in the joint. Call or return to clinic prn if such symptoms occur or there is failure to improve as anticipated.

## 2021-04-07 DIAGNOSIS — G56.03 BILATERAL CARPAL TUNNEL SYNDROME: ICD-10-CM

## 2021-04-23 ENCOUNTER — OFFICE VISIT (OUTPATIENT)
Dept: ORTHOPEDIC SURGERY | Age: 51
End: 2021-04-23
Payer: MEDICAID

## 2021-04-23 VITALS
WEIGHT: 261 LBS | BODY MASS INDEX: 31.78 KG/M2 | TEMPERATURE: 97 F | DIASTOLIC BLOOD PRESSURE: 85 MMHG | HEART RATE: 83 BPM | SYSTOLIC BLOOD PRESSURE: 130 MMHG | RESPIRATION RATE: 20 BRPM | HEIGHT: 76 IN

## 2021-04-23 DIAGNOSIS — R20.2 NUMBNESS AND TINGLING IN BOTH HANDS: ICD-10-CM

## 2021-04-23 DIAGNOSIS — R94.131 ABNORMAL EMG: ICD-10-CM

## 2021-04-23 DIAGNOSIS — R20.0 NUMBNESS AND TINGLING IN BOTH HANDS: Primary | ICD-10-CM

## 2021-04-23 DIAGNOSIS — R20.2 NUMBNESS AND TINGLING IN BOTH HANDS: Primary | ICD-10-CM

## 2021-04-23 DIAGNOSIS — R20.0 NUMBNESS AND TINGLING IN BOTH HANDS: ICD-10-CM

## 2021-04-23 PROBLEM — Z02.79 OTHER ISSUE OF MEDICAL CERTIFICATES: Status: ACTIVE | Noted: 2018-03-14

## 2021-04-23 PROBLEM — I10 ESSENTIAL HYPERTENSION: Status: ACTIVE | Noted: 2020-04-16

## 2021-04-23 PROBLEM — Z86.73 HISTORY OF CVA IN ADULTHOOD: Status: ACTIVE | Noted: 2020-04-16

## 2021-04-23 PROBLEM — H52.03 HYPERMETROPIA, BILATERAL: Status: ACTIVE | Noted: 2018-03-14

## 2021-04-23 PROBLEM — E78.5 DYSLIPIDEMIA: Status: ACTIVE | Noted: 2020-04-16

## 2021-04-23 PROBLEM — M54.16 LUMBAR RADICULOPATHY: Status: ACTIVE | Noted: 2020-01-03

## 2021-04-23 PROBLEM — M51.26 HNP (HERNIATED NUCLEUS PULPOSUS), LUMBAR: Status: ACTIVE | Noted: 2020-01-03

## 2021-04-23 PROBLEM — T15.00XA FOREIGN BODY OF CORNEA: Status: ACTIVE | Noted: 2021-04-23

## 2021-04-23 PROBLEM — H17.11 CENTRAL CORNEAL OPACITY, RIGHT EYE: Status: ACTIVE | Noted: 2021-04-23

## 2021-04-23 PROCEDURE — 95886 MUSC TEST DONE W/N TEST COMP: CPT | Performed by: PHYSICAL MEDICINE & REHABILITATION

## 2021-04-23 PROCEDURE — 95911 NRV CNDJ TEST 9-10 STUDIES: CPT | Performed by: PHYSICAL MEDICINE & REHABILITATION

## 2021-04-23 NOTE — PROGRESS NOTES
[address]  [address], [address]  [address]  Phone: [phone]  Fax: [phone]        Peyman Amos  : 1970  PCP: Sandra Duckworth MD  2021    ELECTROMYOGRAPHY AND NERVE CONDUCTION STUDIES    Mark Owens was referred by Dr. Radha Chang for electrodiagnostic evaluation of bilateral hand numbness and tingling. NCV & EMG Findings:  Evaluation of the left ulnar motor nerve showed decreased conduction velocity (A Elbow-B Elbow, 36 m/s). The left ulnar sensory nerve showed reduced amplitude (7.8 µV). All remaining nerves (as indicated in the following tables) were within normal limits. All left vs. right side differences were within normal limits. All examined muscles (as indicated in the following table) showed no evidence of electrical instability. INTERPRETATION  This is an abnormal electrodiagnostic examination. These findings may be consistent with:   1. 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 CLINICAL INTERPRETATION  His electrodiagnostic findings of cubital tunnel syndrome does not appear consistent with his hand symptoms. There is no electrodiagnostic evidence of carpal tunnel syndrome. HISTORY OF PRESENT ILLNESS  Mark Owens is a 45 y.o. male. Pt presents today for BUE EMG evaluation of bilateral hand numbness and tingling, L>R. PAST MEDICAL HISTORY   Past Medical History:   Diagnosis Date    Hypertension     Stroke [facility])        Past Surgical History:   Procedure Laterality Date    COLONOSCOPY N/A 2020    SCREENING COLONOSCOPY /c Bx Polypectomy performed by Nancy Antonio MD at [facility] ENDOSCOPY   .       MEDICATIONS    Current Outpatient Medications   Medication Sig Dispense Refill    gabapentin (NEURONTIN) 300 mg capsule Take 1 Cap by mouth three (3) times daily. Max Daily Amount: 900 mg. (Patient taking differently: Take 300 mg by mouth daily as needed.) 90 Cap 3    naproxen (NAPROSYN) 250 mg tablet Take  by mouth two (2) times daily (with meals).  aspirin 81 mg chewable tablet Take 1 Tab by mouth daily. 30 Tab 0    amLODIPine-benazepril (LOTREL) 5-20 mg per capsule Take 1 Cap by mouth daily.  rosuvastatin (CRESTOR) 20 mg tablet Take 20 mg by mouth nightly. ALLERGIES  No Known Allergies       SOCIAL HISTORY    Social History     Socioeconomic History    Marital status:      Spouse name: Not on file    Number of children: Not on file    Years of education: Not on file    Highest education level: Not on file   Tobacco Use    Smoking status: Never Smoker    Smokeless tobacco: Never Used   Substance and Sexual Activity    Alcohol use: Never     Alcohol/week: 0.0 standard drinks     Frequency: Never    Drug use: Never    Sexual activity: Yes     Partners: Female       FAMILY HISTORY  No family history on file. PHYSICAL EXAMINATION  Visit Vitals  /85   Pulse 83   Temp 97 °F (36.1 °C) (Tympanic)   Resp 20   Ht 6' 4\" (1.93 m)   Wt 261 lb (118.4 kg)   BMI 31.77 kg/m²       Pain Assessment  3/31/2021   Location of Pain Hand   Location Modifiers Left   Severity of Pain 4   Quality of Pain Throbbing;Grinding   Quality of Pain Comment -   Duration of Pain Persistent   Frequency of Pain Intermittent   Aggravating Factors Bending; Other (Comment)   Aggravating Factors Comment ROM   Limiting Behavior No   Relieving Factors Rest   Relieving Factors Comment -   Result of Injury No   Work-Related Injury -           Constitutional:  Well developed, well nourished, in no acute distress. Psychiatric: Affect and mood are appropriate. Integumentary: No rashes or abrasions noted on exposed areas. SPINE/MUSCULOSKELETAL EXAM    On brief examination: None.       NCV & EMG Findings:  Nerve Conduction Studies  Anti Sensory Summary Table     Stim Site NR Peak (ms) Norm Peak (ms) O-P Amp (µV) Norm O-P Amp Site1 Site2 Delta-P (ms) Dist (cm) Bc (m/s) Norm Bc (m/s)   Left Median Anti Sensory (2nd Digit)   Wrist    3.1 <4 20.0 >13 Wrist 2nd Digit 3.1 14.0 45 >39   Right Median Anti Sensory (2nd Digit)   Wrist    3.0 <4 18.1 >13 Wrist 2nd Digit 3.0 14.0 47 >39   Left Radial Anti Sensory (Base 1st Digit)   Wrist    2.3 2.8 29.7 11 Wrist Base 1st Digit 2.3 10.0 43    Right Radial Anti Sensory (Base 1st Digit)   Wrist    2.3 2.8 19.4 11 Wrist Base 1st Digit 2.3 10.0 43    Left Ulnar Anti Sensory (5th Digit)   Wrist    3.3 <4.0 7.8 >9 Wrist 5th Digit 3.3 14.0 42 >38   Site 2    3.1  7.7          Right Ulnar Anti Sensory (5th Digit)   Wrist    3.1 <4.0 14.4 >9 Wrist 5th Digit 3.1 14.0 45 >38     Motor Summary Table     Stim Site NR Onset (ms) Norm Onset (ms) O-P Amp (mV) Norm O-P Amp Site1 Site2 Delta-0 (ms) Dist (cm) Bc (m/s) Norm Bc (m/s)   Left Median Motor (Abd Poll Brev)   Wrist    3.4 <4.5 11.6 >4.1 Elbow Wrist 5.9 29.0 49 >49   Elbow    9.3  10.1          Right Median Motor (Abd Poll Brev)   Wrist    3.6 <4.5 11.6 >4.1 Elbow Wrist 5.2 27.0 52 >49   Elbow    8.8  9.6          Left Ulnar Motor (Abd Dig Min)   Wrist    2.9 <3.7 8.8 >7.9 B Elbow Wrist 4.5 25.0 56 >52   B Elbow    7.4  6.7  A Elbow B Elbow 2.8 10.0 36 >43   A Elbow    10.2  6.8          Right Ulnar Motor (Abd Dig Min)   Wrist    3.2 <3.7 8.0 >7.9 B Elbow Wrist 4.9 26.5 54 >52   B Elbow    8.1  6.1  A Elbow B Elbow 2.1 10.0 48 >43   A Elbow    10.2  6.0            EMG     Side Muscle Nerve Root Ins Act Fibs Psw Amp Dur Poly Recrt Int Wilhemina Cleverly Comment   Right Biceps Musculocut C5-6 Nml Nml Nml Nml Nml 0 Nml Nml    Right Triceps Radial C6-7-8 Nml Nml Nml Nml Nml 0 Nml Nml    Right PronatorTeres Median C6-7 Nml Nml Nml Nml Nml 0 Nml Nml    Right Ext Digitorum Radial (Post Int) C7-8 Nml Nml Nml Nml Nml 0 Nml Nml    Right 1stDorInt Ulnar C8-T1 Nml Nml Nml Nml Nml 0 Nml Nml    Left Biceps Musculocut C5-6 Nml Nml Nml Nml Nml 0 Nml Nml    Left Triceps Radial C6-7-8 Nml Nml Nml Nml Nml 0 Nml Nml    Left PronatorTeres Median C6-7 Nml Nml Nml Nml Nml 0 Nml Nml    Left Ext Digitorum Radial (Post Int) C7-8 Nml Nml Nml Nml Nml 0 Nml Nml    Left 1stDorInt Ulnar C8-T1 Nml Nml Nml Nml Nml 0 Nml Nml    Left FlexCarpiUln Ulnar C8,T1 Nml Nml Nml Nml Nml 0 Nml Nml        Nerve Conduction Studies  Anti Sensory Left/Right Comparison     Stim Site L Lat (ms) R Lat (ms) L-R Lat (ms) L Amp (µV) R Amp (µV) L-R Amp (%) Site1 Site2 L Bc (m/s) R Bc (m/s) L-R Bc (m/s)   Median Anti Sensory (2nd Digit)   Wrist 3.1 3.0 0.1 20.0 18.1 9.5 Wrist 2nd Digit 45 47 2   Radial Anti Sensory (Base 1st Digit)   Wrist 2.3 2.3 0.0 29.7 19.4 34.7 Wrist Base 1st Digit 43 43 0   Ulnar Anti Sensory (5th Digit)   Wrist 3.3 3.1 0.2 7.8 14.4 45.8 Wrist 5th Digit 42 45 3   Site 2 3.1   7.7            Motor Left/Right Comparison     Stim Site L Lat (ms) R Lat (ms) L-R Lat (ms) L Amp (mV) R Amp (mV) L-R Amp (%) Site1 Site2 L Bc (m/s) R Bc (m/s) L-R Bc (m/s)   Median Motor (Abd Poll Brev)   Wrist 3.4 3.6 0.2 11.6 11.6 0.0 Elbow Wrist 49 52 3   Elbow 9.3 8.8 0.5 10.1 9.6 5.0        Ulnar Motor (Abd Dig Min)   Wrist 2.9 3.2 0.3 8.8 8.0 9.1 B Elbow Wrist 56 54 2   B Elbow 7.4 8.1 0.7 6.7 6.1 9.0 A Elbow B Elbow 36 48 12   A Elbow 10.2 10.2 0.0 6.8 6.0 11.8              Waveforms:                                  [facility]  OFFICE PROCEDURE PROGRESS NOTE        Chart reviewed for the following:   Rubio VIRGEN, have reviewed the History, Physical and updated the Allergic reactions for [employer]     TIME OUT performed immediately prior to start of procedure:   Rubio VIRGEN, have performed the following reviews on [employer] prior to the start of the procedure:            * Patient was identified by name and date of birth   * Agreement on procedure being performed was verified  * Risks and Benefits explained to the patient  * Procedure site verified and marked as necessary  * Patient was positioned for comfort  * Consent was signed and verified     Time: 11:49 AM    Date of procedure: 4/23/2021    Procedure performed by:  Khai Field MD    Provider accompanied by: Nima. Patient accompanied by: Self.     How tolerated by patient: tolerated the procedure well with no complications    Post Procedural Pain Scale: 0 - No Hurt    Comments: none    Written by Saintclair Abbott, [address] as dictated by Murtaza Liz MD

## 2021-04-28 ENCOUNTER — TELEPHONE (OUTPATIENT)
Dept: ORTHOPEDIC SURGERY | Age: 51
End: 2021-04-28

## 2021-04-28 NOTE — TELEPHONE ENCOUNTER
Patient is asking if his appointment on 06/02 can be a virtual.    Patient is also asking if provider can write a refill for gabapentin. He stated he would like it sent to [facility] on [address]. Please advise patient at [phone].

## 2021-04-28 NOTE — TELEPHONE ENCOUNTER
Patient advised to contact spine center for his refill. We will set his appointment up as a virtual.  Gave to [employer] at [facility] to set up.

## 2021-06-28 ENCOUNTER — VIRTUAL VISIT (OUTPATIENT)
Dept: ORTHOPEDIC SURGERY | Age: 51
End: 2021-06-28

## 2022-02-09 ENCOUNTER — HOSPITAL ENCOUNTER (OUTPATIENT)
Dept: LAB | Age: 52
Discharge: HOME | End: 2022-02-09

## 2022-02-09 LAB — XX-LABCORP SPECIMEN COL,LCBCF: NORMAL

## 2022-02-09 PROCEDURE — 99001 SPECIMEN HANDLING PT-LAB: CPT
